# Patient Record
Sex: MALE | Race: WHITE | URBAN - METROPOLITAN AREA
[De-identification: names, ages, dates, MRNs, and addresses within clinical notes are randomized per-mention and may not be internally consistent; named-entity substitution may affect disease eponyms.]

---

## 2017-01-01 ENCOUNTER — OFFICE VISIT (OUTPATIENT)
Dept: FAMILY MEDICINE | Facility: CLINIC | Age: 82
End: 2017-01-01
Payer: COMMERCIAL

## 2017-01-01 ENCOUNTER — HOSPITAL ENCOUNTER (EMERGENCY)
Facility: CLINIC | Age: 82
Discharge: HOME | End: 2017-11-13
Attending: FAMILY MEDICINE | Admitting: FAMILY MEDICINE
Payer: MEDICARE

## 2017-01-01 ENCOUNTER — CARE COORDINATION (OUTPATIENT)
Dept: CARE COORDINATION | Facility: CLINIC | Age: 82
End: 2017-01-01

## 2017-01-01 ENCOUNTER — APPOINTMENT (OUTPATIENT)
Dept: MRI IMAGING | Facility: CLINIC | Age: 82
End: 2017-01-01
Attending: FAMILY MEDICINE
Payer: MEDICARE

## 2017-01-01 ENCOUNTER — DOCUMENTATION ONLY (OUTPATIENT)
Dept: CARE COORDINATION | Facility: CLINIC | Age: 82
End: 2017-01-01

## 2017-01-01 ENCOUNTER — TELEPHONE (OUTPATIENT)
Dept: FAMILY MEDICINE | Facility: CLINIC | Age: 82
End: 2017-01-01

## 2017-01-01 ENCOUNTER — DOCUMENTATION ONLY (OUTPATIENT)
Dept: OTHER | Facility: CLINIC | Age: 82
End: 2017-01-01

## 2017-01-01 ENCOUNTER — TRANSFERRED RECORDS (OUTPATIENT)
Dept: HEALTH INFORMATION MANAGEMENT | Facility: CLINIC | Age: 82
End: 2017-01-01

## 2017-01-01 ENCOUNTER — HOSPITAL ENCOUNTER (OUTPATIENT)
Facility: CLINIC | Age: 82
Setting detail: OBSERVATION
Discharge: HOME | End: 2017-02-24
Attending: FAMILY MEDICINE | Admitting: INTERNAL MEDICINE
Payer: MEDICARE

## 2017-01-01 ENCOUNTER — MEDICAL CORRESPONDENCE (OUTPATIENT)
Dept: HEALTH INFORMATION MANAGEMENT | Facility: CLINIC | Age: 82
End: 2017-01-01

## 2017-01-01 ENCOUNTER — TELEPHONE (OUTPATIENT)
Dept: INTERNAL MEDICINE | Facility: CLINIC | Age: 82
End: 2017-01-01

## 2017-01-01 ENCOUNTER — APPOINTMENT (OUTPATIENT)
Dept: CARDIOLOGY | Facility: CLINIC | Age: 82
End: 2017-01-01
Attending: INTERNAL MEDICINE
Payer: MEDICARE

## 2017-01-01 ENCOUNTER — RADIANT APPOINTMENT (OUTPATIENT)
Dept: GENERAL RADIOLOGY | Facility: OTHER | Age: 82
End: 2017-01-01
Attending: FAMILY MEDICINE
Payer: COMMERCIAL

## 2017-01-01 ENCOUNTER — OFFICE VISIT (OUTPATIENT)
Dept: FAMILY MEDICINE | Facility: OTHER | Age: 82
End: 2017-01-01
Payer: COMMERCIAL

## 2017-01-01 VITALS
BODY MASS INDEX: 24.21 KG/M2 | OXYGEN SATURATION: 100 % | TEMPERATURE: 96.7 F | WEIGHT: 156.9 LBS | RESPIRATION RATE: 16 BRPM | HEART RATE: 64 BPM | DIASTOLIC BLOOD PRESSURE: 46 MMHG | SYSTOLIC BLOOD PRESSURE: 120 MMHG

## 2017-01-01 VITALS
DIASTOLIC BLOOD PRESSURE: 50 MMHG | OXYGEN SATURATION: 100 % | TEMPERATURE: 97 F | RESPIRATION RATE: 16 BRPM | WEIGHT: 153 LBS | SYSTOLIC BLOOD PRESSURE: 122 MMHG | BODY MASS INDEX: 22.59 KG/M2 | HEART RATE: 64 BPM

## 2017-01-01 VITALS
WEIGHT: 151 LBS | DIASTOLIC BLOOD PRESSURE: 46 MMHG | RESPIRATION RATE: 16 BRPM | OXYGEN SATURATION: 100 % | TEMPERATURE: 96.5 F | BODY MASS INDEX: 22.3 KG/M2 | HEART RATE: 68 BPM | SYSTOLIC BLOOD PRESSURE: 128 MMHG

## 2017-01-01 VITALS
OXYGEN SATURATION: 94 % | BODY MASS INDEX: 22.89 KG/M2 | RESPIRATION RATE: 15 BRPM | SYSTOLIC BLOOD PRESSURE: 132 MMHG | TEMPERATURE: 97 F | WEIGHT: 155 LBS | DIASTOLIC BLOOD PRESSURE: 55 MMHG

## 2017-01-01 VITALS
DIASTOLIC BLOOD PRESSURE: 56 MMHG | TEMPERATURE: 97.6 F | OXYGEN SATURATION: 97 % | SYSTOLIC BLOOD PRESSURE: 145 MMHG | BODY MASS INDEX: 23.38 KG/M2 | HEIGHT: 69 IN | HEART RATE: 76 BPM | WEIGHT: 157.85 LBS | RESPIRATION RATE: 18 BRPM

## 2017-01-01 DIAGNOSIS — I50.9 ACUTE ON CHRONIC CONGESTIVE HEART FAILURE, UNSPECIFIED CONGESTIVE HEART FAILURE TYPE: ICD-10-CM

## 2017-01-01 DIAGNOSIS — I25.10 CORONARY ARTERY DISEASE WITHOUT ANGINA PECTORIS, UNSPECIFIED VESSEL OR LESION TYPE, UNSPECIFIED WHETHER NATIVE OR TRANSPLANTED HEART: ICD-10-CM

## 2017-01-01 DIAGNOSIS — I50.22 CHRONIC SYSTOLIC CONGESTIVE HEART FAILURE (H): ICD-10-CM

## 2017-01-01 DIAGNOSIS — G93.89 BRAIN MASS: Primary | ICD-10-CM

## 2017-01-01 DIAGNOSIS — R07.9 CHEST PAIN, UNSPECIFIED TYPE: ICD-10-CM

## 2017-01-01 DIAGNOSIS — D50.9 IRON DEFICIENCY ANEMIA, UNSPECIFIED IRON DEFICIENCY ANEMIA TYPE: ICD-10-CM

## 2017-01-01 DIAGNOSIS — I06.1 RHEUMATIC AORTIC INSUFFICIENCY: ICD-10-CM

## 2017-01-01 DIAGNOSIS — R06.02 SHORTNESS OF BREATH: ICD-10-CM

## 2017-01-01 DIAGNOSIS — I34.0 MODERATE TO SEVERE MITRAL REGURGITATION: ICD-10-CM

## 2017-01-01 DIAGNOSIS — N18.30 CKD (CHRONIC KIDNEY DISEASE) STAGE 3, GFR 30-59 ML/MIN (H): ICD-10-CM

## 2017-01-01 DIAGNOSIS — R47.01 APHASIA DUE TO NEURO-ONCOLOGY DIAGNOSIS: ICD-10-CM

## 2017-01-01 DIAGNOSIS — G93.89 BRAIN MASS: ICD-10-CM

## 2017-01-01 DIAGNOSIS — R05.9 COUGH: Primary | ICD-10-CM

## 2017-01-01 DIAGNOSIS — N18.30 CKD (CHRONIC KIDNEY DISEASE) STAGE 3, GFR 30-59 ML/MIN (H): Primary | ICD-10-CM

## 2017-01-01 DIAGNOSIS — Z71.89 ADVANCED DIRECTIVES, COUNSELING/DISCUSSION: Chronic | ICD-10-CM

## 2017-01-01 DIAGNOSIS — R73.09 ELEVATED GLUCOSE: ICD-10-CM

## 2017-01-01 DIAGNOSIS — Z76.89 HEALTH CARE HOME: Primary | ICD-10-CM

## 2017-01-01 DIAGNOSIS — R05.9 COUGH: ICD-10-CM

## 2017-01-01 DIAGNOSIS — C79.31 MALIGNANT NEOPLASM METASTATIC TO BRAIN (H): Primary | ICD-10-CM

## 2017-01-01 DIAGNOSIS — J81.0 ACUTE PULMONARY EDEMA (H): ICD-10-CM

## 2017-01-01 LAB
ALBUMIN SERPL-MCNC: 3.6 G/DL (ref 3.4–5)
ALP SERPL-CCNC: 50 U/L (ref 40–150)
ALT SERPL W P-5'-P-CCNC: 26 U/L (ref 0–70)
ANION GAP SERPL CALCULATED.3IONS-SCNC: 10 MMOL/L (ref 3–14)
ANION GAP SERPL CALCULATED.3IONS-SCNC: 10 MMOL/L (ref 3–14)
ANION GAP SERPL CALCULATED.3IONS-SCNC: 9 MMOL/L (ref 3–14)
AST SERPL W P-5'-P-CCNC: 30 U/L (ref 0–45)
BASOPHILS # BLD AUTO: 0.1 10E9/L (ref 0–0.2)
BASOPHILS # BLD AUTO: 0.1 10E9/L (ref 0–0.2)
BASOPHILS NFR BLD AUTO: 0.9 %
BASOPHILS NFR BLD AUTO: 1.8 %
BILIRUB SERPL-MCNC: 1.2 MG/DL (ref 0.2–1.3)
BUN SERPL-MCNC: 42 MG/DL (ref 7–30)
BUN SERPL-MCNC: 48 MG/DL (ref 7–30)
BUN SERPL-MCNC: 54 MG/DL (ref 7–30)
CALCIUM SERPL-MCNC: 8.7 MG/DL (ref 8.5–10.1)
CALCIUM SERPL-MCNC: 8.8 MG/DL (ref 8.5–10.1)
CALCIUM SERPL-MCNC: 8.9 MG/DL (ref 8.5–10.1)
CHLORIDE SERPL-SCNC: 104 MMOL/L (ref 94–109)
CHLORIDE SERPL-SCNC: 105 MMOL/L (ref 94–109)
CHLORIDE SERPL-SCNC: 107 MMOL/L (ref 94–109)
CO2 SERPL-SCNC: 26 MMOL/L (ref 20–32)
CO2 SERPL-SCNC: 27 MMOL/L (ref 20–32)
CO2 SERPL-SCNC: 29 MMOL/L (ref 20–32)
CREAT SERPL-MCNC: 1.52 MG/DL (ref 0.66–1.25)
CREAT SERPL-MCNC: 1.56 MG/DL (ref 0.66–1.25)
CREAT SERPL-MCNC: 1.62 MG/DL (ref 0.66–1.25)
DIFFERENTIAL METHOD BLD: ABNORMAL
DIFFERENTIAL METHOD BLD: ABNORMAL
EOSINOPHIL # BLD AUTO: 0.3 10E9/L (ref 0–0.7)
EOSINOPHIL # BLD AUTO: 0.6 10E9/L (ref 0–0.7)
EOSINOPHIL NFR BLD AUTO: 4.2 %
EOSINOPHIL NFR BLD AUTO: 9.3 %
ERYTHROCYTE [DISTWIDTH] IN BLOOD BY AUTOMATED COUNT: 13.3 % (ref 10–15)
ERYTHROCYTE [DISTWIDTH] IN BLOOD BY AUTOMATED COUNT: 13.4 % (ref 10–15)
ERYTHROCYTE [DISTWIDTH] IN BLOOD BY AUTOMATED COUNT: 13.4 % (ref 10–15)
GFR SERPL CREATININE-BSD FRML MDRD: 40 ML/MIN/1.7M2
GFR SERPL CREATININE-BSD FRML MDRD: 42 ML/MIN/1.7M2
GFR SERPL CREATININE-BSD FRML MDRD: 43 ML/MIN/1.7M2
GLUCOSE BLDC GLUCOMTR-MCNC: 101 MG/DL (ref 70–99)
GLUCOSE BLDC GLUCOMTR-MCNC: 104 MG/DL (ref 70–99)
GLUCOSE BLDC GLUCOMTR-MCNC: 107 MG/DL (ref 70–99)
GLUCOSE SERPL-MCNC: 114 MG/DL (ref 70–99)
GLUCOSE SERPL-MCNC: 115 MG/DL (ref 70–99)
GLUCOSE SERPL-MCNC: 118 MG/DL (ref 70–99)
HBA1C MFR BLD: 5.6 % (ref 4.3–6)
HCT VFR BLD AUTO: 30.3 % (ref 40–53)
HCT VFR BLD AUTO: 31.8 % (ref 40–53)
HCT VFR BLD AUTO: 32.1 % (ref 40–53)
HGB BLD-MCNC: 10.2 G/DL (ref 13.3–17.7)
HGB BLD-MCNC: 10.3 G/DL (ref 13.3–17.7)
HGB BLD-MCNC: 9.2 G/DL (ref 13.3–17.7)
IMM GRANULOCYTES # BLD: 0 10E9/L (ref 0–0.4)
IMM GRANULOCYTES NFR BLD: 0.5 %
LYMPHOCYTES # BLD AUTO: 1 10E9/L (ref 0.8–5.3)
LYMPHOCYTES # BLD AUTO: 1.3 10E9/L (ref 0.8–5.3)
LYMPHOCYTES NFR BLD AUTO: 17.1 %
LYMPHOCYTES NFR BLD AUTO: 17.2 %
MCH RBC QN AUTO: 31 PG (ref 26.5–33)
MCH RBC QN AUTO: 31 PG (ref 26.5–33)
MCH RBC QN AUTO: 32.2 PG (ref 26.5–33)
MCHC RBC AUTO-ENTMCNC: 30.4 G/DL (ref 31.5–36.5)
MCHC RBC AUTO-ENTMCNC: 31.8 G/DL (ref 31.5–36.5)
MCHC RBC AUTO-ENTMCNC: 32.4 G/DL (ref 31.5–36.5)
MCV RBC AUTO: 102 FL (ref 78–100)
MCV RBC AUTO: 98 FL (ref 78–100)
MCV RBC AUTO: 99 FL (ref 78–100)
MONOCYTES # BLD AUTO: 0.7 10E9/L (ref 0–1.3)
MONOCYTES # BLD AUTO: 0.9 10E9/L (ref 0–1.3)
MONOCYTES NFR BLD AUTO: 11.9 %
MONOCYTES NFR BLD AUTO: 11.9 %
NEUTROPHILS # BLD AUTO: 3.6 10E9/L (ref 1.6–8.3)
NEUTROPHILS # BLD AUTO: 5.2 10E9/L (ref 1.6–8.3)
NEUTROPHILS NFR BLD AUTO: 59.3 %
NEUTROPHILS NFR BLD AUTO: 65.9 %
NT-PROBNP SERPL-MCNC: 8601 PG/ML (ref 0–450)
PLATELET # BLD AUTO: 149 10E9/L (ref 150–450)
PLATELET # BLD AUTO: 160 10E9/L (ref 150–450)
PLATELET # BLD AUTO: 169 10E9/L (ref 150–450)
POTASSIUM SERPL-SCNC: 4.5 MMOL/L (ref 3.4–5.3)
POTASSIUM SERPL-SCNC: 4.6 MMOL/L (ref 3.4–5.3)
POTASSIUM SERPL-SCNC: 4.7 MMOL/L (ref 3.4–5.3)
PROT SERPL-MCNC: 6.7 G/DL (ref 6.8–8.8)
RBC # BLD AUTO: 2.97 10E12/L (ref 4.4–5.9)
RBC # BLD AUTO: 3.2 10E12/L (ref 4.4–5.9)
RBC # BLD AUTO: 3.29 10E12/L (ref 4.4–5.9)
SODIUM SERPL-SCNC: 141 MMOL/L (ref 133–144)
SODIUM SERPL-SCNC: 143 MMOL/L (ref 133–144)
SODIUM SERPL-SCNC: 143 MMOL/L (ref 133–144)
TROPONIN I SERPL-MCNC: <0.015 UG/L (ref 0–0.04)
TROPONIN I SERPL-MCNC: NORMAL UG/L (ref 0–0.04)
TSH SERPL DL<=0.005 MIU/L-ACNC: 2.22 MU/L (ref 0.4–4)
TSH SERPL DL<=0.05 MIU/L-ACNC: 2.11 MU/L (ref 0.4–4)
VIT B12 SERPL-MCNC: 1134 PG/ML (ref 193–986)
WBC # BLD AUTO: 6.1 10E9/L (ref 4–11)
WBC # BLD AUTO: 6.7 10E9/L (ref 4–11)
WBC # BLD AUTO: 7.8 10E9/L (ref 4–11)

## 2017-01-01 PROCEDURE — 93306 TTE W/DOPPLER COMPLETE: CPT

## 2017-01-01 PROCEDURE — 83036 HEMOGLOBIN GLYCOSYLATED A1C: CPT | Performed by: FAMILY MEDICINE

## 2017-01-01 PROCEDURE — 84443 ASSAY THYROID STIM HORMONE: CPT | Performed by: FAMILY MEDICINE

## 2017-01-01 PROCEDURE — A9585 GADOBUTROL INJECTION: HCPCS | Performed by: RADIOLOGY

## 2017-01-01 PROCEDURE — G0378 HOSPITAL OBSERVATION PER HR: HCPCS

## 2017-01-01 PROCEDURE — 99207 ZZC OFFICE-HOSPITAL ADMIT: CPT | Performed by: FAMILY MEDICINE

## 2017-01-01 PROCEDURE — 84484 ASSAY OF TROPONIN QUANT: CPT | Performed by: FAMILY MEDICINE

## 2017-01-01 PROCEDURE — 99285 EMERGENCY DEPT VISIT HI MDM: CPT | Mod: 25 | Performed by: FAMILY MEDICINE

## 2017-01-01 PROCEDURE — 25000128 H RX IP 250 OP 636: Performed by: RADIOLOGY

## 2017-01-01 PROCEDURE — 82607 VITAMIN B-12: CPT | Performed by: FAMILY MEDICINE

## 2017-01-01 PROCEDURE — 25000132 ZZH RX MED GY IP 250 OP 250 PS 637: Mod: GY | Performed by: INTERNAL MEDICINE

## 2017-01-01 PROCEDURE — 99214 OFFICE O/P EST MOD 30 MIN: CPT | Performed by: FAMILY MEDICINE

## 2017-01-01 PROCEDURE — 99285 EMERGENCY DEPT VISIT HI MDM: CPT | Mod: 25

## 2017-01-01 PROCEDURE — A9270 NON-COVERED ITEM OR SERVICE: HCPCS | Mod: GY | Performed by: FAMILY MEDICINE

## 2017-01-01 PROCEDURE — 80053 COMPREHEN METABOLIC PANEL: CPT | Performed by: FAMILY MEDICINE

## 2017-01-01 PROCEDURE — 70549 MR ANGIOGRAPH NECK W/O&W/DYE: CPT

## 2017-01-01 PROCEDURE — 70553 MRI BRAIN STEM W/O & W/DYE: CPT

## 2017-01-01 PROCEDURE — 99236 HOSP IP/OBS SAME DATE HI 85: CPT | Performed by: INTERNAL MEDICINE

## 2017-01-01 PROCEDURE — 80048 BASIC METABOLIC PNL TOTAL CA: CPT | Performed by: FAMILY MEDICINE

## 2017-01-01 PROCEDURE — 99207 C FOOT EXAM  NO CHARGE: CPT | Performed by: FAMILY MEDICINE

## 2017-01-01 PROCEDURE — 36415 COLL VENOUS BLD VENIPUNCTURE: CPT | Performed by: FAMILY MEDICINE

## 2017-01-01 PROCEDURE — 25000132 ZZH RX MED GY IP 250 OP 250 PS 637: Mod: GY | Performed by: FAMILY MEDICINE

## 2017-01-01 PROCEDURE — 70544 MR ANGIOGRAPHY HEAD W/O DYE: CPT | Mod: XS

## 2017-01-01 PROCEDURE — 93005 ELECTROCARDIOGRAM TRACING: CPT | Performed by: FAMILY MEDICINE

## 2017-01-01 PROCEDURE — 93000 ELECTROCARDIOGRAM COMPLETE: CPT | Performed by: FAMILY MEDICINE

## 2017-01-01 PROCEDURE — 71020 XR CHEST 2 VW: CPT

## 2017-01-01 PROCEDURE — 99214 OFFICE O/P EST MOD 30 MIN: CPT | Mod: 25 | Performed by: FAMILY MEDICINE

## 2017-01-01 PROCEDURE — 85027 COMPLETE CBC AUTOMATED: CPT | Performed by: FAMILY MEDICINE

## 2017-01-01 PROCEDURE — 93010 ELECTROCARDIOGRAM REPORT: CPT | Mod: Z6 | Performed by: FAMILY MEDICINE

## 2017-01-01 PROCEDURE — A9270 NON-COVERED ITEM OR SERVICE: HCPCS | Mod: GY | Performed by: INTERNAL MEDICINE

## 2017-01-01 PROCEDURE — 00000146 ZZHCL STATISTIC GLUCOSE BY METER IP

## 2017-01-01 PROCEDURE — 85025 COMPLETE CBC W/AUTO DIFF WBC: CPT | Performed by: FAMILY MEDICINE

## 2017-01-01 PROCEDURE — 93306 TTE W/DOPPLER COMPLETE: CPT | Mod: 26 | Performed by: INTERNAL MEDICINE

## 2017-01-01 PROCEDURE — 83880 ASSAY OF NATRIURETIC PEPTIDE: CPT | Performed by: FAMILY MEDICINE

## 2017-01-01 PROCEDURE — 93005 ELECTROCARDIOGRAM TRACING: CPT

## 2017-01-01 PROCEDURE — 96374 THER/PROPH/DIAG INJ IV PUSH: CPT

## 2017-01-01 PROCEDURE — 25000125 ZZHC RX 250: Performed by: RADIOLOGY

## 2017-01-01 PROCEDURE — 93010 ELECTROCARDIOGRAM REPORT: CPT | Performed by: FAMILY MEDICINE

## 2017-01-01 PROCEDURE — 25000128 H RX IP 250 OP 636: Performed by: FAMILY MEDICINE

## 2017-01-01 RX ORDER — FUROSEMIDE 20 MG
20 TABLET ORAL DAILY
Status: DISCONTINUED | OUTPATIENT
Start: 2017-01-01 | End: 2017-01-01 | Stop reason: HOSPADM

## 2017-01-01 RX ORDER — GADOBUTROL 604.72 MG/ML
7.5 INJECTION INTRAVENOUS ONCE
Status: COMPLETED | OUTPATIENT
Start: 2017-01-01 | End: 2017-01-01

## 2017-01-01 RX ORDER — BISACODYL 10 MG
10 SUPPOSITORY, RECTAL RECTAL DAILY
COMMUNITY

## 2017-01-01 RX ORDER — VALSARTAN 40 MG/1
20 TABLET ORAL DAILY
Status: DISCONTINUED | OUTPATIENT
Start: 2017-01-01 | End: 2017-01-01 | Stop reason: HOSPADM

## 2017-01-01 RX ORDER — AMLODIPINE BESYLATE 10 MG/1
5 TABLET ORAL DAILY
Qty: 90 TABLET | Refills: 3 | COMMUNITY
Start: 2017-01-01 | End: 2017-01-01

## 2017-01-01 RX ORDER — NALOXONE HYDROCHLORIDE 0.4 MG/ML
.1-.4 INJECTION, SOLUTION INTRAMUSCULAR; INTRAVENOUS; SUBCUTANEOUS
Status: DISCONTINUED | OUTPATIENT
Start: 2017-01-01 | End: 2017-01-01 | Stop reason: HOSPADM

## 2017-01-01 RX ORDER — DEXTROSE MONOHYDRATE 25 G/50ML
25-50 INJECTION, SOLUTION INTRAVENOUS
Status: DISCONTINUED | OUTPATIENT
Start: 2017-01-01 | End: 2017-01-01 | Stop reason: HOSPADM

## 2017-01-01 RX ORDER — HALOPERIDOL 2 MG/ML
.5-1 SOLUTION ORAL EVERY 6 HOURS
COMMUNITY

## 2017-01-01 RX ORDER — FUROSEMIDE 10 MG/ML
40 INJECTION INTRAMUSCULAR; INTRAVENOUS ONCE
Status: COMPLETED | OUTPATIENT
Start: 2017-01-01 | End: 2017-01-01

## 2017-01-01 RX ORDER — METOPROLOL SUCCINATE 50 MG/1
50 TABLET, EXTENDED RELEASE ORAL DAILY
Status: DISCONTINUED | OUTPATIENT
Start: 2017-01-01 | End: 2017-01-01 | Stop reason: HOSPADM

## 2017-01-01 RX ORDER — NICOTINE POLACRILEX 4 MG
15-30 LOZENGE BUCCAL
Status: DISCONTINUED | OUTPATIENT
Start: 2017-01-01 | End: 2017-01-01 | Stop reason: HOSPADM

## 2017-01-01 RX ORDER — MORPHINE SULFATE 100 MG/5ML
2.5-5 SOLUTION ORAL
COMMUNITY

## 2017-01-01 RX ORDER — ASPIRIN 81 MG/1
81 TABLET, CHEWABLE ORAL DAILY
Status: DISCONTINUED | OUTPATIENT
Start: 2017-01-01 | End: 2017-01-01 | Stop reason: HOSPADM

## 2017-01-01 RX ORDER — ATROPINE SULFATE 10 MG/G
2-4 OINTMENT OPHTHALMIC
COMMUNITY

## 2017-01-01 RX ORDER — FUROSEMIDE 20 MG
20 TABLET ORAL DAILY
Qty: 30 TABLET | Refills: 0 | Status: SHIPPED | OUTPATIENT
Start: 2017-01-01 | End: 2017-01-01

## 2017-01-01 RX ORDER — METOPROLOL SUCCINATE 50 MG/1
TABLET, EXTENDED RELEASE ORAL
Qty: 135 TABLET | Refills: 1 | COMMUNITY
Start: 2017-01-01

## 2017-01-01 RX ORDER — LIDOCAINE 40 MG/G
CREAM TOPICAL
Status: DISCONTINUED | OUTPATIENT
Start: 2017-01-01 | End: 2017-01-01 | Stop reason: HOSPADM

## 2017-01-01 RX ORDER — ATORVASTATIN CALCIUM 40 MG/1
40 TABLET, FILM COATED ORAL DAILY
Status: DISCONTINUED | OUTPATIENT
Start: 2017-01-01 | End: 2017-01-01

## 2017-01-01 RX ORDER — ANTACID TABLETS 500 MG/1
500 TABLET, CHEWABLE ORAL 2 TIMES DAILY
COMMUNITY

## 2017-01-01 RX ORDER — DEXAMETHASONE 4 MG/1
4 TABLET ORAL 2 TIMES DAILY
COMMUNITY

## 2017-01-01 RX ORDER — ACETAMINOPHEN 650 MG/1
650 SUPPOSITORY RECTAL EVERY 4 HOURS PRN
COMMUNITY

## 2017-01-01 RX ORDER — FUROSEMIDE 20 MG
20 TABLET ORAL DAILY
Qty: 30 TABLET | Refills: 5 | Status: SHIPPED | OUTPATIENT
Start: 2017-01-01 | End: 2017-01-01

## 2017-01-01 RX ORDER — CETIRIZINE HYDROCHLORIDE 10 MG/1
10 TABLET ORAL DAILY
COMMUNITY

## 2017-01-01 RX ADMIN — METOPROLOL SUCCINATE 50 MG: 50 TABLET, EXTENDED RELEASE ORAL at 08:49

## 2017-01-01 RX ADMIN — FUROSEMIDE 20 MG: 20 TABLET ORAL at 08:49

## 2017-01-01 RX ADMIN — FUROSEMIDE 40 MG: 10 INJECTION, SOLUTION INTRAVENOUS at 19:04

## 2017-01-01 RX ADMIN — VALSARTAN 20 MG: 40 TABLET ORAL at 10:58

## 2017-01-01 RX ADMIN — ASPIRIN 81 MG 81 MG: 81 TABLET ORAL at 08:49

## 2017-01-01 RX ADMIN — ASPIRIN 81 MG 81 MG: 81 TABLET ORAL at 21:52

## 2017-01-01 RX ADMIN — GADOBUTROL 7.5 ML: 604.72 INJECTION INTRAVENOUS at 11:58

## 2017-01-01 RX ADMIN — SODIUM CHLORIDE 50 ML: 9 INJECTION, SOLUTION INTRAVENOUS at 11:59

## 2017-01-01 ASSESSMENT — ENCOUNTER SYMPTOMS
WEAKNESS: 0
FREQUENCY: 0
COUGH: 0
BLOOD IN STOOL: 0
WHEEZING: 0
DIFFICULTY URINATING: 0
FEVER: 0
DYSURIA: 0
PALPITATIONS: 0
CHILLS: 0
DIARRHEA: 0
SHORTNESS OF BREATH: 0
ARTHRALGIAS: 0
WEAKNESS: 0
SORE THROAT: 0
HEADACHES: 0
SINUS PRESSURE: 0
NAUSEA: 1
DIARRHEA: 0
WHEEZING: 0
STRIDOR: 0
NAUSEA: 0
FREQUENCY: 0
FEVER: 0
PALPITATIONS: 0
CHILLS: 0
DIAPHORESIS: 0
CHEST TIGHTNESS: 1
CONSTIPATION: 0
DIZZINESS: 0
VOMITING: 0
SPEECH DIFFICULTY: 1
MYALGIAS: 0
LIGHT-HEADEDNESS: 0
ABDOMINAL PAIN: 0
SHORTNESS OF BREATH: 1
VOMITING: 0

## 2017-01-01 ASSESSMENT — PAIN SCALES - GENERAL
PAINLEVEL: MODERATE PAIN (4)
PAINLEVEL: MILD PAIN (2)
PAINLEVEL: SEVERE PAIN (6)

## 2017-01-17 NOTE — TELEPHONE ENCOUNTER
": 1926  PHONE #'s: [phone] (home)     PRESENTING PROBLEM:  Had one short dizzy spell on  while reading in a chair and again on Monday. He is wondering if he needs to be seen?    NURSING ASSESSMENT  Description:   HE denies any chest pain or SOB associated with this.  \" On  the room spun a little bit and I kind of felt nauseated . That didn't happen on Monday. I have NOT had any dizzy spells yet today.  [facility]. I have been keeping a journal of my BP and pulse rate. IT was 110/58 and pulse is always between 60 to 70.\"   Onset/duration:   1/15/17  Precip. factors:   Etiology unknown. \" I did drink 3 cups of coffee in the morning and not much other liquids.\"  Assoc. Sx:   NONE. NO one sided weakness, no troubles with speech or vision, NO headache, NO LOC.   Improves/worsens Sx:   Improved.   Pain scale (1-10)   0/10  Sx specific meds:  Meclizine on Monday and NO dizzy spells since.  Last exam/Tx:  Has NOT been seen for this.     RECOMMENDED DISPOSITION:  Home care advice - *  During a dizzy spell, lie flat or sit with head down on your lap for a few minutes and take slow deep breaths.   *  If dizziness is accompanied by anxiety, rapid breathing, and numbness in the face or fingers, breathe into a paper bag for 5 to 10 minutes, making sure the mouth and nose are covered by the bag.   *  Sit up or stand up slowly, Avoid sudden changes in posture.  *  If vomits, wait one hour before eating or drinking . Take small frequent sips every 10 minutes (1 TBS ) until knows can drink without vomiting. Then Ok to drink freely. Keep self hydrated.     * Seek emergency care if sudden onset of chest pain, decreased level of consciousness, weakness or difficulty speaking, slow ( < 50) or rapid ( > 130) heart rate.    Will comply with recommendation: YES  If further questions/concerns or if Sx do not improve, worsen or new Sx develop, call your PCP or " [facility] as soon as possible.    NOTES:  Disposition was determined by the first positive assessment question, therefore all previous assessment questions were negative.  Informed to check provider manual or call insurance company to assure coverage.    Guideline used: Dizziness.     Telephone Triage Protocols for Nurses, Fifth Edition, Dinah Jackson RN  January 17, 2017

## 2017-01-17 NOTE — TELEPHONE ENCOUNTER
Reason for Call:  THIS WEEK Day Appointment, Requested Provider:  Mikie Ibanez M.D.    PCP: Mikie Ibanez    Reason for visit: Pt states he's had 2 dizzy spells in the last 2 days, does not want to wait to be seen until first avail in mid Feb, please advise    Duration of symptoms: 2 days    Have you been treated for this in the past? No    Additional comments:     Can we leave a detailed message on this number? YES    Phone number patient can be reached at: Home number on file [phone] (home)    Best Time: any    Call taken on 1/17/2017 at 8:01 AM by Rosalinda Moise

## 2017-01-24 NOTE — TELEPHONE ENCOUNTER
Wife is calling to report patient has been having dizzy spells for the past multiple days.  She states they know there is not much they can do for a 89 year old man, but they are looking for suggestions for what to do at home to improve this.  They are reporting [facility] and [facility].  Wife is informed they should be increasing fluids to a minimum of 6 glasses of 8 oz of water daily.  They are instructed to keep a log of daily fluid intake.  They are also instructed to keep a log of when he has these dizzy spells to see if there is a pattern with what he has eaten before hand.    They are currently using Dramamine for dizzy spells and they are instructed to go get Meclizine for these symptoms.     Patient understands and agrees to this plan.  They will call back with any findings or of they would like to be seen for this.  Closing this encounter.  Nettie Whitman RN

## 2017-02-23 PROBLEM — I50.9 CHF EXACERBATION (H): Status: ACTIVE | Noted: 2017-01-01

## 2017-02-23 NOTE — IP AVS SNAPSHOT
MRN:[id number]                      After Visit Summary   2/23/2017    Dung López    MRN: [id number]           Thank you!     Thank you for choosing [facility] for your care. Our goal is always to provide you with excellent care. Hearing back from our patients is one way we can continue to improve our services. Please take a few minutes to complete the written survey that you may receive in the mail after you visit with us. Thank you!        Patient Information     Date Of Birth          4/1/1926        About your hospital stay     You were admitted on:  February 23, 2017 You last received care in the:  [address] [facility]    You were discharged on:  February 24, 2017        Reason for your hospital stay       Hospitalized due to concern for worsening fluid build up in lungs from heart failure and improved                  Who to Call     For medical emergencies, please call 911.  For non-urgent questions about your medical care, please call your primary care provider or clinic, [phone]          Attending Provider     Provider Specialty    Dev Thorpe MD [facility], Andres Deluna MD Internal Medicine    Sin Lozano MD Internal Medicine       Primary Care Provider Office Phone # Fax #    Mikie Dung Ibanez [phone]       [facility] [address] [place] [address]         When to contact your care team       Call your primary doctor if you have any of the following:  increased shortness of breath, increased swelling or weight gain more than 2 pounds in 1 day or 5 pounds in 1 week.                  After Care Instructions     Activity       Your activity upon discharge: activity as tolerated            Diet       Follow this diet upon discharge: Orders Placed This Encounter      Combination Diet 3130-2982 Calories: Moderate Consistent CHO (4-6 CHO units/meal); 2 gm NA Diet            Monitor and  "record       blood glucose according to your usual home routine  weight every day                  Follow-up Appointments     Follow-up and recommended labs and tests        Follow up with primary care provider, Mikie Ibanez, within 7 days to evaluate medication change and for hospital follow- up.  The following labs/tests are recommended: basic metabolic panel.                  Pending Results     No orders found for last 3 day(s).            Statement of Approval     Ordered          17 1304  I have reviewed and agree with all the recommendations and orders detailed in this document.  EFFECTIVE NOW     Approved and electronically signed by:  Sin Lozano MD             Admission Information     Date & Time Provider Department Dept. Phone    2017 Sin Lozano MD [address] [phone]      Your Vitals Were     Blood Pressure Pulse Temperature Respirations Height Weight    145/56 76 97.6  F (36.4  C) (Oral) 18 1.753 m (5' 9\") 71.6 kg (157 lb 13.6 oz)    Pulse Oximetry BMI (Body Mass Index)                97% 23.31 kg/m2          MyChart Information     [employer] lets you send messages to your doctor, view your test results, renew your prescriptions, schedule appointments and more. To sign up, go to [email or web address] . Click on \"Log in\" on the left side of the screen, which will take you to the Welcome page. Then click on \"Sign up Now\" on the right side of the page.     You will be asked to enter the access code listed below, as well as some personal information. Please follow the directions to create your username and password.     Your access code is: [email or web address]  Expires: 2017  3:22 PM     Your access code will  in 90 days. If you need help or a new code, please call your [facility] clinic or [phone].        Care EveryWhere ID     This is your Care EveryWhere ID. This could be used by other organizations to access your [facility] medical " records  [id number]           Review of your medicines      CONTINUE these medicines which may have CHANGED, or have new prescriptions. If we are uncertain of the size of tablets/capsules you have at home, strength may be listed as something that might have changed.        Dose / Directions    furosemide 20 MG tablet   Commonly known as:  LASIX   This may have changed:  See the new instructions.   Used for:  Chronic systolic congestive heart failure (H)        Dose:  20 mg   Take 1 tablet (20 mg) by mouth daily   Quantity:  30 tablet   Refills:  0       metoprolol 50 MG 24 hr tablet   Commonly known as:  TOPROL-XL   Indication:  per pt he takes once a day   This may have changed:    - how much to take  - how to take this  - when to take this        Take 1/2 tab every am and 1/2 tab every pm   Quantity:  135 tablet   Refills:  1         CONTINUE these medicines which have NOT CHANGED        Dose / Directions    ACCU-CHEK LURDES PLUS test strip   Used for:  Elevated glucose   Generic drug:  blood glucose monitoring        TEST UP TO 4 TIMES DAILY   Quantity:  100 each   Refills:  9       amLODIPine 10 MG tablet   Commonly known as:  NORVASC        Dose:  5 mg   Take 0.5 tablets (5 mg) by mouth daily   Quantity:  90 tablet   Refills:  3       artificial saliva Soln solution   Used for:  Dry mouth        Dose:  5 mL   Swish and spit 5 mLs in mouth as needed for dry mouth   Quantity:  1 Bottle   Refills:  5       aspirin 81 MG tablet        Dose:  81 mg   Take 1 tablet (81 mg) by mouth daily   Quantity:  30 tablet   Refills:  0       blood glucose monitoring lancets   Used for:  Elevated glucose        2-4 daily as needed for changing blood sugars   Quantity:  200 each   Refills:  3       CITRACAL + D PO        Dose:  2 tablet   Take 2 tablets by mouth daily.   Refills:  0       cyanocobalamin 1000 MCG tablet   Commonly known as:  vitamin  B-12   Used for:  Anemia, unspecified        2 daily   Quantity:  100 Tab    Refills:  11       EAR DROPS OT        Place  in ear(s). As needed   Refills:  0       ferrous sulfate 325 (65 FE) MG tablet   Commonly known as:  IRON        Dose:  1 tablet   Take 1 tablet by mouth daily (with breakfast).   Refills:  0       LIPITOR 80 MG tablet   Generic drug:  atorvastatin        Dose:  40 mg   Take 0.5 tablets (40 mg) by mouth daily   Quantity:  30 tablet   Refills:  0       valsartan 40 MG tablet   Commonly known as:  DIOVAN   Used for:  Coronary atherosclerosis of unspecified type of vessel, native or graft, Congenital anomaly of aortic arch, Rheumatic mitral insufficiency        1/2 TABLET IN AM   Refills:  1       VITAMIN D PO        Dose:  1 tablet   Take 1 tablet by mouth daily.   Refills:  0            Where to get your medicines      These medications were sent to [facility] [address] [place] [address] - [address]  [address], [facility] [address]     Phone:  [phone]     furosemide 20 MG tablet                Protect others around you: Learn how to safely use, store and throw away your medicines at www.disposemymeds.org.             Medication List: This is a list of all your medications and when to take them. Check marks below indicate your daily home schedule. Keep this list as a reference.      Medications           Morning Afternoon Evening Bedtime As Needed    ACCU-CHEK LURDES PLUS test strip   TEST UP TO 4 TIMES DAILY   Generic drug:  blood glucose monitoring                                amLODIPine 10 MG tablet   Commonly known as:  NORVASC   Take 0.5 tablets (5 mg) by mouth daily                                   artificial saliva Soln solution   Swish and spit 5 mLs in mouth as needed for dry mouth                                aspirin 81 MG tablet   Take 1 tablet (81 mg) by mouth daily                                   blood glucose monitoring lancets   2-4 daily as needed for changing blood sugars                                CITRACAL + D PO   Take 2 tablets by mouth  daily.                                   cyanocobalamin 1000 MCG tablet   Commonly known as:  vitamin  B-12   2 daily                                   EAR DROPS OT   Place  in ear(s). As needed                                   ferrous sulfate 325 (65 FE) MG tablet   Commonly known as:  IRON   Take 1 tablet by mouth daily (with breakfast).                                   furosemide 20 MG tablet   Commonly known as:  LASIX   Take 1 tablet (20 mg) by mouth daily   Last time this was given:  20 mg on 2/24/2017  8:49 AM                                   LIPITOR 80 MG tablet   Take 0.5 tablets (40 mg) by mouth daily   Generic drug:  atorvastatin                                   metoprolol 50 MG 24 hr tablet   Commonly known as:  TOPROL-XL   Take 1/2 tab every am and 1/2 tab every pm   Last time this was given:  50 mg on 2/24/2017  8:49 AM                                      valsartan 40 MG tablet   Commonly known as:  DIOVAN   1/2 TABLET IN AM   Last time this was given:  20 mg on 2/24/2017 10:58 AM                                   VITAMIN D PO   Take 1 tablet by mouth daily.                                             More Information        Patient Education    Metoprolol Succinate Oral tablet, extended-release    Metoprolol Tartrate Oral tablet    Metoprolol Tartrate Solution for injection  Metoprolol Tartrate Oral tablet  What is this medicine?  METOPROLOL (me TOE proe lole) is a beta-blocker. Beta-blockers reduce the workload on the heart and help it to beat more regularly. This medicine is used to treat high blood pressure and to prevent chest pain. It is also used to after a heart attack and to prevent an additional heart attack from occurring.  This medicine may be used for other purposes; ask your health care provider or pharmacist if you have questions.  What should I tell my health care provider before I take this medicine?  They need to know if you have any of these conditions:    diabetes    heart or  vessel disease like slow heart rate, worsening heart failure, heart block, sick sinus syndrome or Raynaud's disease    kidney disease    liver disease    lung or breathing disease, like asthma or emphysema    pheochromocytoma    thyroid disease    an unusual or allergic reaction to metoprolol, other beta-blockers, medicines, foods, dyes, or preservatives    pregnant or trying to get pregnant    breast-feeding  How should I use this medicine?  Take this medicine by mouth with a drink of water. Follow the directions on the prescription label. Take this medicine immediately after meals. Take your doses at regular intervals. Do not take more medicine than directed. Do not stop taking this medicine suddenly. This could lead to serious heart-related effects.  Talk to your pediatrician regarding the use of this medicine in children. Special care may be needed.  Overdosage: If you think you have taken too much of this medicine contact a poison control center or emergency room at once.  NOTE: This medicine is only for you. Do not share this medicine with others.  What if I miss a dose?  If you miss a dose, take it as soon as you can. If it is almost time for your next dose, take only that dose. Do not take double or extra doses.  What may interact with this medicine?  This medicine may interact with the following medications:    certain medicines for blood pressure, heart disease, irregular heart beat    certain medicines for depression like monoamine oxidase (MAO) inhibitors, fluoxetine, or paroxetine    clonidine    dobutamine    epinephrine    isoproterenol    reserpine  This list may not describe all possible interactions. Give your health care provider a list of all the medicines, herbs, non-prescription drugs, or dietary supplements you use. Also tell them if you smoke, drink alcohol, or use illegal drugs. Some items may interact with your medicine.  What should I watch for while using this medicine?  Visit your doctor  or health care professional for regular check ups. Contact your doctor right away if your symptoms worsen. Check your blood pressure and pulse rate regularly. Ask your health care professional what your blood pressure and pulse rate should be, and when you should contact them.  You may get drowsy or dizzy. Do not drive, use machinery, or do anything that needs mental alertness until you know how this medicine affects you. Do not sit or stand up quickly, especially if you are an older patient. This reduces the risk of dizzy or fainting spells. Contact your doctor if these symptoms continue. Alcohol may interfere with the effect of this medicine. Avoid alcoholic drinks.  What side effects may I notice from receiving this medicine?  Side effects that you should report to your doctor or health care professional as soon as possible:    allergic reactions like skin rash, itching or hives    cold or numb hands or feet    depression    difficulty breathing    faint    fever with sore throat    irregular heartbeat, chest pain    rapid weight gain    swollen legs or ankles  Side effects that usually do not require medical attention (report to your doctor or health care professional if they continue or are bothersome):    anxiety or nervousness    change in sex drive or performance    dry skin    headache    nightmares or trouble sleeping    short term memory loss    stomach upset or diarrhea    unusually tired  This list may not describe all possible side effects. Call your doctor for medical advice about side effects. You may report side effects to FDA at 6-317-FDA-2288.  Where should I keep my medicine?  Keep out of the reach of children.  Store at room temperature between 15 and 30 degrees C (59 and 86 degrees F). Throw away any unused medicine after the expiration date.  NOTE:This sheet is a summary. It may not cover all possible information. If you have questions about this medicine, talk to your doctor, pharmacist, or  health care provider. Copyright  2016 Gold Standard                Patient Education    Furosemide Oral solution    Furosemide Oral tablet    Furosemide Solution for injection  Furosemide Oral tablet  What is this medicine?  FUROSEMIDE (fyjanna [place] se mide) is a diuretic. It helps you make more urine and to lose salt and excess water from your body. This medicine is used to treat high blood pressure, and edema or swelling from heart, kidney, or liver disease.  This medicine may be used for other purposes; ask your health care provider or pharmacist if you have questions.  What should I tell my health care provider before I take this medicine?  They need to know if you have any of these conditions:    abnormal blood electrolytes    diarrhea or vomiting    gout    heart disease    kidney disease, small amounts of urine, or difficulty passing urine    liver disease    an unusual or allergic reaction to furosemide, sulfa drugs, other medicines, foods, dyes, or preservatives    pregnant or trying to get pregnant    breast-feeding  How should I use this medicine?  Take this medicine by mouth with a glass of water. Follow the directions on the prescription label. You may take this medicine with or without food. If it upsets your stomach, take it with food or milk. Do not take your medicine more often than directed. Remember that you will need to pass more urine after taking this medicine. Do not take your medicine at a time of day that will cause you problems. Do not take at bedtime.  Talk to your pediatrician regarding the use of this medicine in children. While this drug may be prescribed for selected conditions, precautions do apply.  Overdosage: If you think you have taken too much of this medicine contact a poison control center or emergency room at once.  NOTE: This medicine is only for you. Do not share this medicine with others.  What if I miss a dose?  If you miss a dose, take it as soon as you can. If it is almost  time for your next dose, take only that dose. Do not take double or extra doses.  What may interact with this medicine?    aspirin and aspirin-like medicines    certain antibiotics    chloral hydrate    cisplatin    cyclosporine    digoxin    diuretics    laxatives    lithium    medicines for blood pressure    medicines that relax muscles for surgery    methotrexate    NSAIDs, medicines for pain and inflammation like ibuprofen, naproxen, or indomethacin    phenytoin    steroid medicines like prednisone or cortisone    sucralfate  This list may not describe all possible interactions. Give your health care provider a list of all the medicines, herbs, non-prescription drugs, or dietary supplements you use. Also tell them if you smoke, drink alcohol, or use illegal drugs. Some items may interact with your medicine.  What should I watch for while using this medicine?  Visit your doctor or health care professional for regular checks on your progress. Check your blood pressure regularly. Ask your doctor or health care professional what your blood pressure should be, and when you should contact him or her. If you are a diabetic, check your blood sugar as directed.  You may need to be on a special diet while taking this medicine. Check with your doctor. Also, ask how many glasses of fluid you need to drink a day. You must not get dehydrated.  You may get drowsy or dizzy. Do not drive, use machinery, or do anything that needs mental alertness until you know how this drug affects you. Do not stand or sit up quickly, especially if you are an older patient. This reduces the risk of dizzy or fainting spells. Alcohol can make you more drowsy and dizzy. Avoid alcoholic drinks.  This medicine can make you more sensitive to the sun. Keep out of the sun. If you cannot avoid being in the sun, wear protective clothing and use sunscreen. Do not use sun lamps or tanning beds/booths.  What side effects may I notice from receiving this  medicine?  Side effects that you should report to your doctor or health care professional as soon as possible:    blood in urine or stools    dry mouth    fever or chills    hearing loss or ringing in the ears    irregular heartbeat    muscle pain or weakness, cramps    skin rash    stomach upset, pain, or nausea    tingling or numbness in the hands or feet    unusually weak or tired    vomiting or diarrhea    yellowing of the eyes or skin  Side effects that usually do not require medical attention (report to your doctor or health care professional if they continue or are bothersome):    headache    loss of appetite    unusual bleeding or bruising  This list may not describe all possible side effects. Call your doctor for medical advice about side effects. You may report side effects to FDA at 9-875-FDA-2055.  Where should I keep my medicine?  Keep out of the reach of children.  Store at room temperature between 15 and 30 degrees C (59 and 86 degrees F). Protect from light. Throw away any unused medicine after the expiration date.  NOTE:This sheet is a summary. It may not cover all possible information. If you have questions about this medicine, talk to your doctor, pharmacist, or health care provider. Copyright  2016 Gold Standard

## 2017-02-23 NOTE — NURSING NOTE
"Chief Complaint   Patient presents with     Cough     x's 10 days     Shortness of Breath     with excertion       Initial /46 (BP Location: Left arm, Patient Position: Chair, Cuff Size: Adult Regular)  Pulse 64  Temp 96.7  F (35.9  C) (Temporal)  Wt 156 lb 14.4 oz (71.2 kg)  BMI 24.21 kg/m2 Estimated body mass index is 24.21 kg/(m^2) as calculated from the following:    Height as of 4/6/16: 5' 7.5\" (1.715 m).    Weight as of this encounter: 156 lb 14.4 oz (71.2 kg).  Medication Reconciliation: complete  "

## 2017-02-23 NOTE — MR AVS SNAPSHOT
"              After Visit Summary   2017    Dung López    MRN: [id number]           Patient Information     Date Of Birth          1926        Visit Information        Provider Department      2017 1:30 PM Dev Waterman MD [facility]        Today's Diagnoses     Cough    -  1    Shortness of breath        Iron deficiency anemia, unspecified iron deficiency anemia type        Chest pain, unspecified type           Follow-ups after your visit        Who to contact     If you have questions or need follow up information about today's clinic visit or your schedule please contact [facility] directly at [phone].  Normal or non-critical lab and imaging results will be communicated to you by ContaAzul[employer], letter or phone within 4 business days after the clinic has received the results. If you do not hear from us within 7 days, please contact the clinic through [employer]ul[employer] or phone. If you have a critical or abnormal lab result, we will notify you by phone as soon as possible.  Submit refill requests through My [employer] Database or call your pharmacy and they will forward the refill request to us. Please allow 3 business days for your refill to be completed.          Additional Information About Your Visit        MyChart Information     My [employer] lets you send messages to your doctor, view your test results, renew your prescriptions, schedule appointments and more. To sign up, go to [email or web address] . Click on \"Log in\" on the left side of the screen, which will take you to the Welcome page. Then click on \"Sign up Now\" on the right side of the page.     You will be asked to enter the access code listed below, as well as some personal information. Please follow the directions to create your username and password.     Your access code is: [email or web address]  Expires: 2017  3:22 PM     Your access code will  in 90 days. If you need help or a new code, please call your [facility] or " [phone].        Care EveryWhere ID     This is your Care EveryWhere ID. This could be used by other organizations to access your [facility] medical records  [id number]        Your Vitals Were     Pulse Temperature Respirations Pulse Oximetry BMI (Body Mass Index)       64 96.7  F (35.9  C) (Temporal) 16 100% 24.21 kg/m2        Blood Pressure from Last 3 Encounters:   02/23/17 120/46   07/18/16 128/64   04/06/16 128/48    Weight from Last 3 Encounters:   02/23/17 156 lb 14.4 oz (71.2 kg)   07/18/16 151 lb (68.5 kg)   04/06/16 152 lb (68.9 kg)              We Performed the Following     CBC with platelets differential     EKG 12-lead complete w/read - Clinics     N terminal pro BNP outpatient     Troponin I          Today's Medication Changes          These changes are accurate as of: 2/23/17  3:22 PM.  If you have any questions, ask your nurse or doctor.               These medicines have changed or have updated prescriptions.        Dose/Directions    amLODIPine 10 MG tablet   Commonly known as:  NORVASC   This may have changed:  Another medication with the same name was removed. Continue taking this medication, and follow the directions you see here.   Changed by:  Dev Waterman MD        Dose:  5 mg   Take 0.5 tablets (5 mg) by mouth daily   Quantity:  90 tablet   Refills:  3                Primary Care Provider Office Phone # Fax #    Mikie Dung Ibanez [phone]       [place] [address] [place] [address]        Thank you!     Thank you for choosing [facility]  for your care. Our goal is always to provide you with excellent care. Hearing back from our patients is one way we can continue to improve our services. Please take a few minutes to complete the written survey that you may receive in the mail after your visit with us. Thank you!             Your Updated Medication List - Protect others around you: Learn how to safely use, store and  throw away your medicines at www.disposemymeds.org.          This list is accurate as of: 2/23/17  3:22 PM.  Always use your most recent med list.                   Brand Name Dispense Instructions for use    ACCU-CHEK LURDES PLUS test strip   Generic drug:  blood glucose monitoring     100 each    TEST UP TO 4 TIMES DAILY       amLODIPine 10 MG tablet    NORVASC    90 tablet    Take 0.5 tablets (5 mg) by mouth daily       artificial saliva Soln solution     1 Bottle    Swish and spit 5 mLs in mouth as needed for dry mouth       aspirin 81 MG tablet     30 tablet    Take 1 tablet (81 mg) by mouth daily       blood glucose monitoring lancets     200 each    2-4 daily as needed for changing blood sugars       CITRACAL + D PO      Take 2 tablets by mouth daily.       cyanocobalamin 1000 MCG tablet    vitamin  B-12    100 Tab    2 daily       EAR DROPS OT      Place  in ear(s). As needed       ferrous sulfate 325 (65 FE) MG tablet    IRON     Take 1 tablet by mouth daily (with breakfast).       furosemide 20 MG tablet    LASIX    30 tablet    TAKE ONE TABLET BY MOUTH ONCE DAILY AS NEEDED       LIPITOR 80 MG tablet   Generic drug:  atorvastatin     30 tablet    Take 0.5 tablets (40 mg) by mouth daily       metoprolol 50 MG 24 hr tablet    TOPROL-XL    135 tablet    Take 1/2 tab every am and 1/2 tab every pm       valsartan 40 MG tablet    DIOVAN     1/2 TABLET IN AM       VITAMIN D PO      Take 1 tablet by mouth daily.

## 2017-02-23 NOTE — PROGRESS NOTES
SUBJECTIVE:                                                    Dung López is a 89 year old male who presents to clinic today for the following health issues:      RESPIRATORY SYMPTOMS      Duration: 2 weeks    Description  nasal congestion, cough, hoarse voice, myalgias and dizzy    Severity: moderate    Accompanying signs and symptoms: None    History (predisposing factors):  none    Precipitating or alleviating factors: None    Therapies tried and outcome:  rest and fluids guaifenesin       Cough:   sx as noted above. In addition, has developed coy and c/p with exertion.   no fever chills or significant purulent sputum. No hemoptysis or much in the way of leg edema.   he has some chronic heart valvular issues.    ROS: o/w neg    Exam:/46 (BP Location: Left arm, Patient Position: Chair, Cuff Size: Adult Regular)  Pulse 64  Temp 96.7  F (35.9  C) (Temporal)  Resp 16  Wt 156 lb 14.4 oz (71.2 kg)  SpO2 100%  BMI 24.21 kg/m2  Constitutional: healthy, alert and no distress    ENT: ENT exam normal, no neck nodes or sinus tenderness  Cardiovascular: RR gr 2-3 larry  Respiratory: bilat rales  Gastrointestinal: Abdomen soft, non-tender. BS normal. No masses, organomegaly  Extr: tr edema    CXR: bliat pl effusions, poss pneumonia  EKG: non acute, first degree AV block  CBC hg 10.3  Wbc nl  Trop, bnp pending  [facility]    IMP: pneumonia/chf/ angina  PLAN: discussed with Po saha and Jovanni Thorpe, will trans to ED for likely hospital admission    dbue

## 2017-02-23 NOTE — IP AVS SNAPSHOT
[address] [facility]    [address] [place] [address]    Phone:  [phone]                                       After Visit Summary   2/23/2017    Dung López    MRN: [id number]           After Visit Summary Signature Page     I have received my discharge instructions, and my questions have been answered. I have discussed any challenges I see with this plan with the nurse or doctor.    ..........................................................................................................................................  Patient/Patient Representative Signature      ..........................................................................................................................................  Patient Representative Print Name and Relationship to Patient    ..................................................               ................................................  Date                                            Time    ..........................................................................................................................................  Reviewed by Signature/Title    ...................................................              ..............................................  Date                                                            Time

## 2017-02-23 NOTE — ED NOTES
Seen in Clinic by Dr. Waterman today for not feeling well and shortness of breath. He was sent to the ER for further eval and with the understanding that he will be admitted for a few days.

## 2017-02-23 NOTE — ED PROVIDER NOTES
"  History     Chief Complaint   Patient presents with     Shortness of Breath     The history is provided by the patient and medical records.     Dung López is a 89 year old male who presents to the emergency department with shortness of breath. He states that for the last week he has been having chest pain/tightness and shortness of breath with little activity. The shortness of breath is described as a \"pressing\" in his right upper lung. He made an appointment with Dr. Waterman for today and had a chest X-ray done. Patient says he was told it looks like he has pneumonia and fluid build up. Currently, he feels \"pretty good\".  He says he usually goes to the gym daily and walks on the treadmill but 3-4 days ago he was unable to perform his normal activity. He states he moved to a different machine that requires less work which he did okay with. After needing to do this, he says he knew this was when he needed to be seen and he made the appointment for today with Dr. Waterman. He also notes nausea for a week and a half. Patient denies fever or chills. He notes a history of lower extremities edema which a \"Lasix tablet is able to clear\". He notes a history of heart bypass in 2009 and mitral insufficiency, but he states his heart feels the same as always.    Nurse Note:  Seen in Clinic by Dr. Waterman today for not feeling well and shortness of breath. He was sent to the ER for further eval and with the understanding that he will be admitted for a few days.    Results from his 9/30/15 Echocardiogram:  Interpretation Summary     There is mild concentric left ventricular hypertrophy.  Left ventricular systolic function is normal.  E by E prime ratio is greater than 15, that likely suggests increased left  ventricular filling pressures.  The right ventricular systolic function is borderline reduced.  The left atrium is moderate to severely dilated.  The right atrium is moderately dilated.  The mitral valve leaflets appear thickened, " but open well.  There is moderate to mod-severe (2-3+) mitral regurgitation.  MR postero-lateral directed with some Coanda effect. The leaflets do not  fully coapt due to annular dilatation  There is mild (1+) aortic regurgitation.  Borderline aortic root dilatation.  Borderline dilated PA      I have reviewed the Medications, Allergies, Past Medical and Surgical History, and Social History in the Epic system.    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     Rheumatic aortic insufficiency     DDD (degenerative disc disease), lumbar     Elevated glucose     Iron deficiency anemia     CKD (chronic kidney disease) stage 3, GFR 30-59 ml/min     HYPERLIPIDEMIA LDL GOAL <100     Advanced directives, counseling/discussion     Metatarsalgia     Esophageal reflux     Rosacea     Chronic systolic congestive heart failure (H)     Atherosclerosis of native coronary artery of native heart without angina pectoris     Past Medical History   Diagnosis Date     Atrial fibrillation (H) 06/24/09     New onset     Congestive heart failure, unspecified 7/14/2009     Admit.       Coronary atherosclerosis of native coronary artery      Other and unspecified angina pectoris 06/24/09     Transfer to [facility]     Personal history of other malignant neoplasm of skin      Pure hypercholesterolemia      Type II or unspecified type diabetes mellitus without mention of complication, not stated as uncontrolled 7/17/2009     this was suspected but pt has never been mediacted for DM       Past Surgical History   Procedure Laterality Date      repair ing hernia,5+y/o,reducibl  1989     C excis infratent meningioma  1997     [facility] left heart catheterization  03/16/2005     Left heart catheterization with selective coronary angiography and bilateral selective renal angiography.  [facility].      Colonoscopy  12/20/2006     Melanosis coli.  Internal hemorrhoids.     Hc inj transforamin epidural, lumb/sacr ea addtl  2008     Hc inj transforamin epidural, lumb/sacr single  2009     Cardiac catherization  6/10/2009     [facility]     Carotid endarterectomy       Coronary artery bypass  7/2/2009     Triple coronary artery bypass grafting. [facility].     Hc remv cataract extracap,insert lens       Bilateral       No family history on file.    Social History   Substance Use Topics     Smoking status: Former Smoker     Packs/day: 0.40     Years: 5.00     Quit date: 1/1/1949     Smokeless tobacco: Never Used     Alcohol use No        Immunization History   Administered Date(s) Administered     Influenza (H1N1) 12/29/2009     Influenza (High Dose) 3 valent vaccine 10/01/2013     Influenza (IIV3) 10/17/1995, 10/16/1996, 10/21/1997, 10/15/1998, 10/18/1999, 11/10/2000, 10/16/2001, 10/23/2002, 10/19/2004, 10/15/2007, 09/25/2009, 09/01/2012, 09/05/2014     Pneumococcal 23 valent 09/25/2009     TD (ADULT, 7+) 11/22/1999, 04/26/2010     Tdap (Adacel,Boostrix) 01/08/2013          Allergies   Allergen Reactions     Lisinopril [Ace Inhibitors] Cough     Zetia [Ezetimibe] Rash       Current Outpatient Prescriptions   Medication Sig Dispense Refill     amLODIPine (NORVASC) 10 MG tablet Take 0.5 tablets (5 mg) by mouth daily 90 tablet 3     ACCU-CHEK LURDES PLUS test strip TEST UP TO 4 TIMES DAILY 100 each 9     furosemide (LASIX) 20 MG tablet TAKE ONE TABLET BY MOUTH ONCE DAILY AS NEEDED 30 tablet 5     aspirin 81 MG tablet Take 1 tablet (81 mg) by mouth daily 30 tablet      artificial saliva, BIOTENE MT, (BIOTENE MT) SOLN Swish and spit 5 mLs in mouth as needed for dry mouth 1 Bottle 5     atorvastatin (LIPITOR) 80 MG tablet Take 0.5 tablets (40 mg) by mouth daily 30 tablet      metoprolol (TOPROL-XL) 50 MG 24 hr tablet Take 1/2 tab every am and 1/2 tab every pm 135 tablet 1     ACCU-CHEK MULTICLIX LANCETS MISC 2-4  daily as needed for changing blood sugars 200 each 3     ferrous sulfate 325 (65 FE) MG tablet Take 1 tablet by mouth daily (with breakfast).       Calcium Citrate-Vitamin D (CITRACAL + D PO) Take 2 tablets by mouth daily.       Cholecalciferol (VITAMIN D PO) Take 1 tablet by mouth daily.       VALSARTAN 40 MG PO TABS 1/2 TABLET IN AM  1     VITAMIN B-12 1000 MCG PO TABS 2 daily 100 Tab 11     [DISCONTINUED] amLODIPine (NORVASC) 5 MG tablet Take 0.5 tablets (2.5 mg) by mouth daily 90 tablet 3     Carbamide Peroxide (EAR DROPS OT) Place  in ear(s). As needed         Review of Systems   Constitutional: Negative for chills and fever.   Respiratory: Positive for chest tightness and shortness of breath. Negative for wheezing and stridor.    Cardiovascular: Positive for chest pain. Negative for palpitations and leg swelling.   Gastrointestinal: Positive for nausea. Negative for diarrhea and vomiting.   Genitourinary: Negative for decreased urine volume, difficulty urinating and frequency.   Musculoskeletal: Negative for arthralgias and myalgias.   Skin: Negative for pallor and rash.   Neurological: Negative for dizziness, weakness and light-headedness.   All other systems reviewed and are negative.      Physical Exam   BP: 140/63  Heart Rate: 64  Temp: 96.9  F (36.1  C)  Weight: 71.6 kg (157 lb 14.4 oz)  SpO2: 99 %    Physical Exam   Constitutional: He is oriented to person, place, and time. He appears well-developed and well-nourished.   HENT:   Head: Normocephalic and atraumatic.   Eyes: Conjunctivae and EOM are normal.   Neck: Normal range of motion.   Cardiovascular: Normal rate, regular rhythm and intact distal pulses.    Murmur heard.  Pulmonary/Chest: Effort normal. No respiratory distress. He has no wheezes. He has rales.   Abdominal: Soft. Bowel sounds are normal. He exhibits no distension. There is no tenderness.   Musculoskeletal: Normal range of motion.   Neurological: He is alert and oriented to person,  place, and time.   Skin: Skin is warm and dry.   Psychiatric: He has a normal mood and affect. His behavior is normal.   Nursing note and vitals reviewed.      ED Course     ED Course     Procedures             EKG Interpretation:      Interpreted by Dev Thorpe  Time reviewed:  4:39 PM   Symptoms at time of EKG: shortness of breath and chest tightness   Rhythm: Sinus rhythm with 1 degree AV block  Rate: Normal (61)   Axis: Normal  Ectopy: none  Conduction: 1st degree AV block with CT interval 232   ST Segments/ T Waves: No ST-T wave changes and No acute ischemic changes  Q Waves: none  Comparison to prior: Unchanged from Sept 2015    Clinical Impression: sinus rhythm with first degree AV block ( ms)      Troponin today from clinic was normal    BNP today from clinic was elevated at 8601.     Results for orders placed or performed in visit on 02/23/17 (from the past 24 hour(s))   XR Chest 2 Views    Narrative    CHEST TWO VIEWS   2/23/2017 2:10 PM     HISTORY: Cough. Shortness of breath.    COMPARISON: Chest x-rays dated 11/9/2011.    FINDINGS:  Since the prior study, there has been development of patchy  opacity in the left base posteriorly. There are increased interstitial  markings and there are small bilateral pleural fluid collections and  the heart is mildly enlarged. Sternal cerclage wires and mediastinal  vascular clips are noted consistent with prior CABG. No pneumothorax  is seen.      Impression    IMPRESSION: Findings are most consistent with congestive heart failure  with possible left basilar infiltrate superimposed upon the vascular  congestion.    I discussed these findings with Dr. Waterman on 2/23/2017 at approximately  3:00 PM.    ROYER WATKINS MD       Medications   furosemide (LASIX) injection 40 mg (not administered)        Assessments & Plan (with Medical Decision Making)  Dung came to the emergency department from the [facility].  Dr. Dev Waterman saw this patient in clinic today  and was concerned that he has congestive heart failure and perhaps pneumonia.  The patient's been having 1 week symptoms of increasing shortness of breath with chest tightness.  The patient has an underlying medical condition of aortic stenosis and mitral valve regurg.  His exam today shows significant heart murmurs and rales throughout both lung fields.  His chest x-ray shows congestive heart failure with possible left basilar infiltrate.  His BNP is 8600 and his troponin is normal.  The patient is medically stable here in the ED with oxygen saturations of 93-96% on room air and systolic blood pressure of 140 mmHg.  Because of this patient's aortic stenosis and mitral valve regurgitation I think he is going to be very volume dependent and requires cautious diuresis.  I spoke with Dr. Toni Taylor, hospitalist, about bringing this patient into the hospital.  Regular Debra after 1 dose of IV Lasix here in the ED.  Dr. Taylor will manage this patient's congestive heart failure on the inpatient service.  I will write observation orders.       I have reviewed the nursing notes.    I have reviewed the findings, diagnosis, plan and need for follow up with the patient.    New Prescriptions    No medications on file       Final diagnoses:   Acute on chronic congestive heart failure, unspecified congestive heart failure type (H)       This document serves as a record of services personally performed by Dev Thorpe MD. It was created on their behalf by Radha Taylor, a trained medical scribe. The creation of this record is based on the provider's personal observations and the statements of the patient. This document has been checked and approved by the attending provider.     Note: Chart documentation done in part with Dragon Voice Recognition software. Although reviewed after completion, some word and grammatical errors may remain.    2/23/2017   [facility] EMERGENCY DEPARTMENT     Dev Thorpe  MD Chris  02/23/17 [id number]

## 2017-02-24 PROBLEM — J81.0 ACUTE PULMONARY EDEMA (H): Status: ACTIVE | Noted: 2017-01-01

## 2017-02-24 PROBLEM — I48.0 PAROXYSMAL ATRIAL FIBRILLATION (H): Status: ACTIVE | Noted: 2017-01-01

## 2017-02-24 PROBLEM — I50.33 ACUTE ON CHRONIC DIASTOLIC CONGESTIVE HEART FAILURE (H): Status: ACTIVE | Noted: 2017-01-01

## 2017-02-24 PROBLEM — I51.7 LVH (LEFT VENTRICULAR HYPERTROPHY): Status: ACTIVE | Noted: 2017-01-01

## 2017-02-24 NOTE — PROGRESS NOTES
S-(situation): shift note    B-(background): Acute on chronic congestive heart failure    A-(assessment): Pt is alert and oriented. Vitals stable. O2 sats 98% on room air. 1500 urine output over night. Pt denies chest pain or shortness of breath.    R-(recommendations): Continue to monitor.

## 2017-02-24 NOTE — H&P
[facility]    History and Physical  Hospitalist       Date of Admission:  2/23/2017  Date of Service (when I saw the patient): 02/24/17    Assessment & Plan       Active Problems:    Acute on chronic diastolic congestive heart failure (H)    Assessment: mild symptoms with mostly exertional dyspnea.  EKG without ischemic changes and troponin normal.  Slightly more swollen from his baseline and BNP elevated.  ED thought he should be monitored initially for diuresis due to his age.    Plan: register to observation, IV lasix already given with good results, resume po lasix later this morning, update his echocardiogram, continue ARB and beta blocker      Rheumatic mitral insufficiency    Assessment: noted on previous echo    Plan: as above      Rheumatic aortic insufficiency    Assessment: mild disease    Plan: f/u echo      Iron deficiency anemia    Assessment: stable    Plan: no intervention      CKD (chronic kidney disease) stage 3, GFR 30-59 ml/min    Assessment: stable    Plan: no intervention      CAD (coronary artery disease) - CABG in 2009    Assessment: initially describes some CP but really more SOB with activity    Plan: no acute intervention      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    Andres Taylor MD    Primary Care Physician   Mikieraymond Ibanez    Chief Complaint   SOB    History is obtained from the patient    History of Present Illness   Dung López is a 89 year old male who presents with SOB.  He has noticed slightly more swelling in his ankles and has been more SOB with activity in the last week.  He denies cough, fever, chills or  sweats.  He has had some chest pain but on further questioning is really more SOB with activity.  He presented to the clinic and then sent to the ED.     Past Medical History    I have reviewed this patient's medical history and updated it with pertinent information if needed.   Past Medical History   Diagnosis Date     Atrial fibrillation (H) 06/24/09     New onset     Congestive heart failure, unspecified 7/14/2009     Admit.       Coronary atherosclerosis of native coronary artery      Other and unspecified angina pectoris 06/24/09     Transfer to [facility]     Personal history of other malignant neoplasm of skin      Pure hypercholesterolemia      Type II or unspecified type diabetes mellitus without mention of complication, not stated as uncontrolled 7/17/2009     this was suspected but pt has never been mediacted for DM       Past Surgical History   I have reviewed this patient's surgical history and updated it with pertinent information if needed.  Past Surgical History   Procedure Laterality Date     Hc repair ing hernia,5+y/o,reducibl  1989     C excis infratent meningioma  1997     [facility] left heart catheterization  03/16/2005     Left heart catheterization with selective coronary angiography and bilateral selective renal angiography.  [facility].     Colonoscopy  12/20/2006     Melanosis coli.  Internal hemorrhoids.     Hc inj transforamin epidural, lumb/sacr ea addtl  2008     Hc inj transforamin epidural, lumb/sacr single  2009     Cardiac catherization  6/10/2009     [facility]     Carotid endarterectomy       Coronary artery bypass  7/2/2009     Triple coronary artery bypass grafting. [facility].     Hc remv cataract extracap,insert lens       Bilateral       Prior to Admission Medications   Prior to Admission Medications   Prescriptions Last Dose Informant Patient Reported? Taking?   ACCU-CHEK LURDES PLUS test strip Past Week at Unknown time  No Yes   Sig:  TEST UP TO 4 TIMES DAILY   ACCU-CHEK MULTICLIX LANCETS MISC Past Week at Unknown time  No Yes   Si-4 daily as needed for changing blood sugars   Calcium Citrate-Vitamin D (CITRACAL + D PO) 2017 at 0800  Yes Yes   Sig: Take 2 tablets by mouth daily.   Carbamide Peroxide (EAR DROPS OT) Unknown at Unknown time  Yes No   Sig: Place  in ear(s). As needed   Cholecalciferol (VITAMIN D PO) 2017 at 0800  Yes Yes   Sig: Take 1 tablet by mouth daily.   VALSARTAN 40 MG PO TABS 2017 at 0800  Yes Yes   Si/2 TABLET IN AM   VITAMIN B-12 1000 MCG PO TABS 2017 at 0800  No Yes   Si daily   amLODIPine (NORVASC) 10 MG tablet 2017 at 0800  Yes Yes   Sig: Take 0.5 tablets (5 mg) by mouth daily   artificial saliva, BIOTENE MT, (BIOTENE MT) SOLN 2017 at 0800  No Yes   Sig: Swish and spit 5 mLs in mouth as needed for dry mouth   aspirin 81 MG tablet 2017 at 0800  Yes Yes   Sig: Take 1 tablet (81 mg) by mouth daily   atorvastatin (LIPITOR) 80 MG tablet 2017 at 0800  Yes Yes   Sig: Take 0.5 tablets (40 mg) by mouth daily   ferrous sulfate 325 (65 FE) MG tablet 2017 at 0800  Yes Yes   Sig: Take 1 tablet by mouth daily (with breakfast).   furosemide (LASIX) 20 MG tablet Past Week at Unknown time  No Yes   Sig: TAKE ONE TABLET BY MOUTH ONCE DAILY AS NEEDED   metoprolol (TOPROL-XL) 50 MG 24 hr tablet 2017 at 0800  Yes Yes   Sig: Take 50 mg by mouth daily Take 1/2 tab every am and 1/2 tab every pm      Facility-Administered Medications: None     Allergies   Allergies   Allergen Reactions     Lisinopril [Ace Inhibitors] Cough     Zetia [Ezetimibe] Rash       Social History   I have reviewed this patient's social history and updated it with pertinent information if needed. Dung López  reports that he quit smoking about 68 years ago. He has a 2.00 pack-year smoking history. He has never used smokeless tobacco. He reports that he does not drink alcohol or use illicit drugs.    Family  History   I have reviewed this patient's family history and updated it with pertinent information if needed.   No family history on file.    Review of Systems   The 10 point Review of Systems is negative other than noted in the HPI or here.     Physical Exam   Temp: 96.5  F (35.8  C) Temp src: Oral BP: 144/62 Pulse: 71 Heart Rate: 71 Resp: 20 SpO2: 98 % O2 Device: None (Room air)    Vital Signs with Ranges  Temp:  [96.5  F (35.8  C)-96.9  F (36.1  C)] 96.5  F (35.8  C)  Pulse:  [64-72] 71  Heart Rate:  [64-71] 71  Resp:  [16-20] 20  BP: (120-152)/(46-99) 144/62  SpO2:  [92 %-100 %] 98 %  157 lbs 13.59 oz    Constitutional:   awake, alert, cooperative, no apparent distress, and appears stated age     Eyes:   Lids and lashes normal, pupils equal, round and reactive to light, extra ocular muscles intact, sclera clear, conjunctiva normal     ENT:   Normocephalic, without obvious abnormality, atraumatic, sinuses nontender on palpation, external ears without lesions, oral pharynx with moist mucous membranes, tonsils without erythema or exudates, gums normal and good dentition.     Neck:   Supple, symmetrical, trachea midline, no adenopathy, thyroid symmetric, not enlarged and no tenderness, skin normal     Hematologic / Lymphatic:   no cervical lymphadenopathy and no supraclavicular lymphadenopathy     Back:   Symmetric, no curvature, spinous processes are non-tender on palpation, paraspinous muscles are non-tender on palpation, no costal vertebral tenderness     Lungs:   No increased work of breathing, good air exchange, clear to auscultation bilaterally, no crackles or wheezing     Cardiovascular:   Normal apical impulse, regular rate and rhythm, normal S1 and S2, no S3 or S4, and 3/6 systolic murmur noted throughout the precordium     Abdomen:   normal bowel sounds, soft, non-distended, non-tender, no masses palpated, no hepatosplenomegally     Neurologic:   Awake, alert, oriented to name, place and time.  Cranial  nerves II-XII are grossly intact.  Motor is 5 out of 5 bilaterally.   Sensory is intact.         Data   Data reviewed today:  I personally reviewed the EKG tracing showing NSR with no acute ischemic changes.    Recent Labs  Lab 02/23/17  1403   WBC 7.8   HGB 10.3*   MCV 99      TROPI <0.015The 99th percentile for upper reference range is 0.045 ug/L.  Troponin values in the range of 0.045 - 0.120 ug/L may be associated with risks of adverse clinical events.       Recent Results (from the past 24 hour(s))   XR Chest 2 Views    Narrative    CHEST TWO VIEWS   2/23/2017 2:10 PM     HISTORY: Cough. Shortness of breath.    COMPARISON: Chest x-rays dated 11/9/2011.    FINDINGS:  Since the prior study, there has been development of patchy  opacity in the left base posteriorly. There are increased interstitial  markings and there are small bilateral pleural fluid collections and  the heart is mildly enlarged. Sternal cerclage wires and mediastinal  vascular clips are noted consistent with prior CABG. No pneumothorax  is seen.      Impression    IMPRESSION: Findings are most consistent with congestive heart failure  with possible left basilar infiltrate superimposed upon the vascular  congestion.    I discussed these findings with Dr. Waterman on 2/23/2017 at approximately  3:00 PM.    ROYER WATKINS MD

## 2017-02-24 NOTE — TELEPHONE ENCOUNTER
Nurse called this afternoon to set up hospital follow up appointment.  There were no openings but he needs to be seen within 7 days.  Please contact this patient.    Thank you!  Barbara CLAYTON  Central Scheduler

## 2017-02-24 NOTE — PROGRESS NOTES
"S-(situation): Patient registered to Observation. Patient arrived to room 252 via wheelchair from ED    B-(background): CHF    A-(assessment): /62  Pulse 71  Temp 96.5  F (35.8  C) (Oral)  Resp 20  Ht 1.753 m (5' 9\")  Wt 71.6 kg (157 lb 13.6 oz)  SpO2 98%  BMI 23.31 kg/m2 Pt is alert and oriented.  Lungs diminished with crackles in the bases. Skin intact. Pt oriented to room.    R-(recommendations): Orders and observation goals reviewed with pt    Nursing Observation criteria listed below was met:    Skin issues/needs documented:NA  Isolation needs addressed, if appropriate: NA  Fall Prevention: Education given and documented: Yes  Education Assessment documented:Yes  Education Documented (Pre-existing chronic infection such as, MRSA/VRE need education on admission): Yes  New medication patient education completed and documented (Possible Side Effects of Common Medications handout): Yes  Home medications if not able to send immediately home with family stored here: NA  Reminder note placed in discharge instructions: NA  Patient has discharge needs (If yes, please explain): No            "

## 2017-02-24 NOTE — PROGRESS NOTES
S-(situation): Patient discharged to home with wife    B-(background): Observation goals met     A-(assessment): obs goals met with an understanding of CHF; booklet given.    R-(recommendations): Discharge instructions reviewed with patient and wife. Listed belongings gathered and returned to patient.  Patient Education resolved: Yes  New medications-Pt. Has been educated about reason of use and side effects Yes  Home and hospital acquired medications returned to patient Yes  Medication Bin checked and emptied on discharge Yes

## 2017-02-24 NOTE — DISCHARGE SUMMARY
[facility] Observation Discharge Summary    Dung López MRN# [id number]   Age: 89 year old YOB: 1926     Date of Observation:  2/23/2017  Date of Discharge:  2/24/2017   Initial Physician:  Andres Taylor MD  Discharge Physician:  iSn Lozano MD     Home clinic: [facility]  Primary care provider: Mikie Ibanez          Admission Diagnoses:   Acute on chronic congestive heart failure, unspecified congestive heart failure type (H) [I50.9]          Discharge Diagnoses:   Principal diagnosis: Acute pulmonary edema (H)    Secondary diagnoses:    Acute pulmonary edema (H)    Moderate to severe mitral regurgitation    Rheumatic aortic insufficiency    CKD (chronic kidney disease) stage 3, GFR 30-59 ml/min    CAD (coronary artery disease) - CABG in 2009    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 *            Procedures:   No procedures performed during this hospital stay             Discharge Medications:     Outpatient Prescriptions Marked as Taking for the 2/23/17 encounter (Hospital Encounter)   Medication Sig     metoprolol (TOPROL-XL) 50 MG 24 hr tablet Take 1/2 tab every am and 1/2 tab every pm     furosemide (LASIX) 20 MG tablet Take 1 tablet (20 mg) by mouth daily     amLODIPine (NORVASC) 10 MG tablet Take 0.5 tablets (5 mg) by mouth daily     ACCU-CHEK LURDES PLUS test strip TEST UP TO 4 TIMES DAILY     aspirin 81 MG tablet Take 1 tablet (81 mg) by mouth daily     artificial saliva, BIOTENE MT, (BIOTENE MT) SOLN Swish and spit 5 mLs in mouth as needed for dry mouth     atorvastatin (LIPITOR) 80 MG tablet Take 0.5 tablets (40 mg) by mouth daily     ACCU-CHEK MULTICLIX LANCETS MISC 2-4 daily as needed for changing blood sugars     ferrous sulfate 325 (65 FE) MG tablet Take 1 tablet by mouth daily (with breakfast).     Calcium  Citrate-Vitamin D (CITRACAL + D PO) Take 2 tablets by mouth daily.     Cholecalciferol (VITAMIN D PO) Take 1 tablet by mouth daily.     VALSARTAN 40 MG PO TABS 1/2 TABLET IN AM     VITAMIN B-12 1000 MCG PO TABS 2 daily       Current Discharge Medication List      CONTINUE these medications which have CHANGED    Details   metoprolol (TOPROL-XL) 50 MG 24 hr tablet Take 1/2 tab every am and 1/2 tab every pm  Qty: 135 tablet, Refills: 1      furosemide (LASIX) 20 MG tablet Take 1 tablet (20 mg) by mouth daily  Qty: 30 tablet, Refills: 0    Associated Diagnoses: Chronic systolic congestive heart failure (H)         CONTINUE these medications which have NOT CHANGED    Details   amLODIPine (NORVASC) 10 MG tablet Take 0.5 tablets (5 mg) by mouth daily  Qty: 90 tablet, Refills: 3      ACCU-CHEK LURDES PLUS test strip TEST UP TO 4 TIMES DAILY  Qty: 100 each, Refills: 9    Associated Diagnoses: Elevated glucose      aspirin 81 MG tablet Take 1 tablet (81 mg) by mouth daily  Qty: 30 tablet      artificial saliva, BIOTENE MT, (BIOTENE MT) SOLN Swish and spit 5 mLs in mouth as needed for dry mouth  Qty: 1 Bottle, Refills: 5    Associated Diagnoses: Dry mouth      atorvastatin (LIPITOR) 80 MG tablet Take 0.5 tablets (40 mg) by mouth daily  Qty: 30 tablet      ACCU-CHEK MULTICLIX LANCETS MISC 2-4 daily as needed for changing blood sugars  Qty: 200 each, Refills: 3    Associated Diagnoses: Elevated glucose      ferrous sulfate 325 (65 FE) MG tablet Take 1 tablet by mouth daily (with breakfast).      Calcium Citrate-Vitamin D (CITRACAL + D PO) Take 2 tablets by mouth daily.      Cholecalciferol (VITAMIN D PO) Take 1 tablet by mouth daily.      VALSARTAN 40 MG PO TABS 1/2 TABLET IN AM  Refills: 1    Associated Diagnoses: Coronary atherosclerosis of unspecified type of vessel, native or graft; Congenital anomaly of aortic arch; Rheumatic mitral insufficiency      VITAMIN B-12 1000 MCG PO TABS 2 daily  Qty: 100 Tab, Refills: 11     Associated Diagnoses: Anemia, unspecified      Carbamide Peroxide (EAR DROPS OT) Place  in ear(s). As needed                   Consultations:   No consultations were requested during this hospital stay            Brief History of Illness:   89 year old male patient with rheumatic valvular heart disease, coronary artery disease, diabetes, and atrial fibrillation presented with several days history of worsening exertional dyspnea and ankle swelling.   Because of concerns for pulmonary edema and possible congestive heart failure, he was hospitalized. See ER note and history and physical for details.          Hospital Course:   Patient was observed in the hospital.  Chest x-ray demonstrated findings consistent with pulmonary edema.  Troponin was normal.  BNP was elevated.  There were no acute ischemic changes on EKG.  He had no dysrhythmias on telemetry monitoring, and EKG demonstrated sinus rhythm with first-degree AV block.  Although a superimposed infiltrate could not be excluded radiographically on chest x-ray, he did not have fever or leukocytosis nor did he have other clinical symptoms suspicious for pneumonia.  Echocardiogram demonstrated normal LV ejection fraction with normal right ventricle size and function.  Mild LVH was present, but there was no mention of diastolic dysfunction.  Mitral valve chordae were abnormal and there was moderately severe to severe mitral regurgitation with an eccentric jet.  Moderate pulmonary hypertension was also described.  After one dose of IV Lasix, he had brisk diuresis with dramatic clinical improvement.  He was able to tolerate advancing activity without significant exertional dyspnea by discharge.  He was advised to use oral Lasix daily rather than as needed and to monitor daily weights.  A 2 g sodium diet was recommended along with his usual diabetic diet.  After investigation, recently worsening pulmonary edema due to rheumatic mitral valve disease with severe mitral  regurgitation was suspected.    I evaluated this patient on the day of discharge.  Vital signs were stable and oxygenation was normal in room air.  Respiratory effort was normal.  Air movement was good and lung fields were clear.  Heart rate was regular with regular rhythm.  Mild peripheral edema was present.            Discharge Instructions and Follow-Up:   Discharge diet: Moderate consistent carbohydrate (2216-6796 mati / 4-6 CHO units per meal)  2g salt   Discharge activity: Activity as tolerated   Discharge follow-up: Follow up with primary care provider in 1 week      Pending test results: None         Discharge Disposition:   Discharged to home      Attestation:  I have reviewed today's vital signs, notes, medications, and test results.    Sin Lozano MD

## 2017-02-24 NOTE — TELEPHONE ENCOUNTER
Apt scheduled for Friday , 3/3/17/ at 10:30 am and nurses station states they will inform patient as he has not left yet VORB Dr. Mikie Ibanez/Jasmin Kumar CMA ([facility])

## 2017-02-28 NOTE — LETTER
My Heart Failure Action Plan   Name: Dung López    YOB: 1926   Date: 2/28/2017    My doctor: Mikie Ibanez   [address]   [address]  Tel. [phone] / Fax [phone]  My Diagnosis: Combined Heart Failure   My Ejection Fraction: Over 50%    My Exercise Goal: 30 minutes daily  .     My Weight Goal: 145 pounds on home scale  Wt Readings from Last 2 Encounters:   02/23/17 157 lb 13.6 oz (71.6 kg)   02/23/17 156 lb 14.4 oz (71.2 kg)     Weigh yourself daily using the same scale. If you gain more than 2 pounds in 24 hours or 5 pounds in a week call the clinic    My Diet Goal: No added salt and 2,000 mg of sodium    Emergency Room Visits:    Our goal is to improve your quality of life and help you avoid a visit to the emergency room or hospital.  If we work together, we can achieve this goal. But, if you feel you need to call 911 or go to the emergency room, please do so.  If you go to the emergency room, please bring your list of medicines and your daily weight chart with you.       GREEN ZONE     Doing well today    Weight gained is no more than 2 pounds a day or 5 pounds a week.    No swelling in feet, ankles, legs or stomach.    No more swelling than usual.    No more trouble breathing than usual.    No change in my sleep.    No other problems. Actions:    I am doing fine.  I will take my medicine, follow my diet, see my doctor, exercise, and watch for symptoms.           YELLOW ZONE         Having a bad day or flare up    Weight gain of more than 2 pounds in one day or 5 pounds in one week.    New swelling in ankle, leg, knee or thigh.    Bloating in belly, pants feel tighter.    Swelling in hands or face.    Coughing or trouble breathing while walking or talking.    Harder to breathe last night.    Have trouble sleeping, wake up short of breath.    Much more tired than usual.    Not eating.    Pain in my chest or bad leg  cramps.    Feel weak or dizzy. Actions:    I need to take action and call my doctor or nurse today.                 RED ZONE         Need medical care now    Weight gain of 5 pounds overnight.    Chest pain or pressure that does not go away.    Feel less alert.    Wheezing or have trouble breathing when at rest.    Cannot sleep lying down.    Cannot take my water pill.    Pass out or faint. Actions:    I need to call my doctor or nurse now!    Call 911 if I have chest pain or cannot breathe.

## 2017-02-28 NOTE — PROGRESS NOTES
Clinic Care Coordination Contact  [facility]/Voicemail    Referral Source: [facility]  Clinical Data: Care Coordinator Outreach  Outreach attempted x 1.  Left message on voicemail with wife to have patient call back  Care Coordinator will try to reach patient again in 1business days.      Susy Tariq RN,BSN  Clinical Care Coordinator    [facility] [phone]  [facility] [phone]

## 2017-02-28 NOTE — PROGRESS NOTES
Clinic Care Coordination Contact  OUTREACH    Referral Information:  Referral Source: CTS  Reason for Contact: Hospital follow up HF        Universal Utilization:   ED Visits in last year: 1  Hospital visits in last year: 1  Last PCP appointment: 07/18/16  Missed Appointments: 2     [facility] Prov[facility]    Clinical Concerns:  Current Medical Concerns: Spoke with pt, states he is doing quite well. He was just out visiting a friend at the [place] home.  States he has not had any chest pain or sob since he has been home. He does weigh himself daily and states he is stable at 145 on his home scale. He has been taking his lasix every day now and feels this is contributing to his more stable weights. Discussed diabetes with pt, states back in 2009 after his heart surgery he was sent home with a meter and strips and told to check everyday.  States he checks daily and his blood sugars run around 100-101 pretty constantly. He is not on any diabetic medications or insulin. His last A1c was 5.9 back in 2010. In reviewing the labs from [facility] there is also no record of any updated A1c.  Pt states he just sticks to a diabetic diet and goes to the gym every morning and he does fine. Given pt's age and long term stability of blood sugars suggested talking with his provider about discontinuing checking his blood sugars following another normal A1c. Pt does have a hospital follow up appt for 3/3/17.  Will send out information on Care Coordination and follow up in a week.    Current Behavioral Concerns: no current concerns     Clinical Pathway Name: Heart Failure    Medication Management:  Pt sets up and self administers medications     Functional Status:  Mobility Status: Independent     Transportation: drives in town     Psychosocial:  Current living arrangement:: I live in a private home with spouse  Financial/Insurance: no current concerns     Resources and Interventions:  Current Resources:  no current needs        Advanced Care Plans/Directives on file:: Yes        Goals: TBD  Frequency of Care Coordination:  (weekly)  Upcoming appointment: 03/03/17     Plan:   Pt will continue to take medications as prescribed  Pt will continue to weigh himself daily  Pt will follow up at next appt 3/3/17

## 2017-03-03 PROBLEM — I48.0 PAROXYSMAL ATRIAL FIBRILLATION (H): Status: RESOLVED | Noted: 2017-01-01 | Resolved: 2017-01-01

## 2017-03-03 PROBLEM — J81.0 ACUTE PULMONARY EDEMA (H): Status: RESOLVED | Noted: 2017-01-01 | Resolved: 2017-01-01

## 2017-03-03 NOTE — NURSING NOTE
"Chief Complaint   Patient presents with     Hospital F/U       Initial /46 (BP Location: Left arm, Patient Position: Chair, Cuff Size: Adult Regular)  Pulse 68  Temp 96.5  F (35.8  C) (Temporal)  Resp 16  Wt 151 lb (68.5 kg)  SpO2 100%  BMI 22.3 kg/m2 Estimated body mass index is 22.3 kg/(m^2) as calculated from the following:    Height as of 2/23/17: 5' 9\" (1.753 m).    Weight as of this encounter: 151 lb (68.5 kg).  Medication Reconciliation: complete   Patient. Scheduled to see us for chronic conditions in April  "

## 2017-03-03 NOTE — PROGRESS NOTES
SUBJECTIVE:                                                    Dung López is a 89 year old male who presents to clinic today for the following health issues:          Hospital Follow-up Visit:    Hospital/Nursing Home/IP Rehab Facility: [facility]  Date of Admission: 2/23/17  Date of Discharge: 2/24/17  Reason(s) for Admission: CHF            Problems taking medications regularly:  None       Medication changes since discharge: None       Problems adhering to non-medication therapy:  None    Summary of hospitalization:  [facility] discharge summary reviewed  Diagnostic Tests/Treatments reviewed.  Follow up needed: none  Other Healthcare Providers Involved in Patient s Care:         None  Update since discharge: improved.     Post Discharge Medication Reconciliation: discharge medications reconciled and changed, per note/orders (see AVS).  Plan of care communicated with patient     Coding guidelines for this visit:  Type of Medical   Decision Making Face-to-Face Visit       within 7 Days of discharge Face-to-Face Visit        within 14 days of discharge   Moderate Complexity 53521 [id number]   High Complexity 42217 [id number]            Feels much better    Problem list and histories reviewed & adjusted, as indicated.  Additional history: as documented    BP Readings from Last 3 Encounters:   03/03/17 128/46   02/24/17 145/56   02/23/17 120/46    Wt Readings from Last 3 Encounters:   03/03/17 151 lb (68.5 kg)   02/23/17 157 lb 13.6 oz (71.6 kg)   02/23/17 156 lb 14.4 oz (71.2 kg)                  Labs reviewed in EPIC    Reviewed and updated as needed this visit by clinical staff       Reviewed and updated as needed this visit by Provider         ROS:  Constitutional, HEENT, cardiovascular, pulmonary, gi and gu systems are negative, except as otherwise noted.  Legs are less swollen now than they had been in the days prior to hospitalization. He is active and resumed fitness center  activities    OBJECTIVE:                                                    /46  Pulse 68  Temp 96.5  F (35.8  C) (Temporal)  Resp 16  Wt 151 lb (68.5 kg)  SpO2 100%  BMI 22.3 kg/m2  Body mass index is 22.3 kg/(m^2).  GENERAL: healthy, alert and no distress  EYES: Eyes grossly normal to inspection, PERRL and conjunctivae and sclerae normal  NECK: no adenopathy, no asymmetry, masses, or scars and thyroid normal to palpation  RESP: lungs clear to auscultation - no rales, rhonchi or wheezes  CV: Fairly prominent harsh systolic murmur. Otherwise regular rate  ABDOMEN: soft, nontender, no hepatosplenomegaly, no masses and bowel sounds normal  MS: no gross musculoskeletal defects noted, no edema  NEURO: Normal strength and tone, mentation intact and speech normal    Diagnostic Test Results:  No results found for this or any previous visit (from the past 24 hour(s)).     ASSESSMENT/PLAN:                                                    Heart Failure: Stage B - EF:Over 50%; chronic, controlled   Associated with the following complications    None   Plan:  No changes in the patient's current treatment plan          1. Chronic systolic congestive heart failure (H)  This is well controlled in the furosemide is helping. Will check basic profile today  - furosemide (LASIX) 20 MG tablet; Take 1 tablet (20 mg) by mouth daily  Dispense: 30 tablet; Refill: 5    2. CKD (chronic kidney disease) stage 3, GFR 30-59 ml/min  As above  - Basic metabolic panel    3. Iron deficiency anemia, unspecified iron deficiency anemia type  From chronic disease and no change needed    4. Coronary artery disease without angina pectoris, unspecified vessel or lesion type, unspecified whether native or transplanted heart  Well-controlled    5. Acute pulmonary edema (H)  Reason for hospitalization and after this visit will consider this resolved  - Basic metabolic panel    6. 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 [facility]      MEDICATIONS:  Continue current medications without change  Patient Instructions   Continue the same.       Mikie Dung Ibanez MD  [facility]

## 2017-03-03 NOTE — MR AVS SNAPSHOT
After Visit Summary   3/3/2017    Dung López    MRN: [id number]           Patient Information     Date Of Birth          4/1/1926        Visit Information        Provider Department      3/3/2017 10:30 AM Mikie Ibanez MD [facility]        Today's Diagnoses     CKD (chronic kidney disease) stage 3, GFR 30-59 ml/min    -  1    Chronic systolic congestive heart failure (H)        Iron deficiency anemia, unspecified iron deficiency anemia type        Coronary artery disease without angina pectoris, unspecified vessel or lesion type, unspecified whether native or transplanted heart        Acute pulmonary edema (H)        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         Follow-ups after your visit        Your next 10 appointments already scheduled     Apr 05, 2017  7:30 AM CDT   Office Visit with Mikie Ibanez MD   [facility]    [address] [place] [address] [id number] [phone]           Bring a current list of meds and any records pertaining to this visit.  For Physicals, please bring immunization records and any forms needing to be filled out.  Please arrive 10 minutes early to complete paperwork.              Who to contact     If you have questions or need follow up information about today's clinic visit or your schedule please contact [facility] directly at [phone].  Normal or non-critical lab and imaging results will be communicated to you by MyChart, letter or phone within 4 business days after the clinic has received the results. If you do not hear from us within 7 days, please contact the clinic through Yotpohart or phone. If you have a critical or abnormal lab result, we will notify you by phone as soon as possible.  Submit refill requests through [employer] or call your pharmacy and they will forward the refill request to us. Please allow 3 business  "days for your refill to be completed.          Additional Information About Your Visit        MyChart Information     [employer] lets you send messages to your doctor, view your test results, renew your prescriptions, schedule appointments and more. To sign up, go to [email or web address] . Click on \"Log in\" on the left side of the screen, which will take you to the Welcome page. Then click on \"Sign up Now\" on the right side of the page.     You will be asked to enter the access code listed below, as well as some personal information. Please follow the directions to create your username and password.     Your access code is: [email or web address]  Expires: 2017  3:22 PM     Your access code will  in 90 days. If you need help or a new code, please call your [place] clinic or [phone].        Care EveryWhere ID     This is your [facility] EveryWhere ID. This could be used by other organizations to access your [place] medical records  [id number]        Your Vitals Were     Pulse Temperature Respirations Pulse Oximetry BMI (Body Mass Index)       68 96.5  F (35.8  C) (Temporal) 16 100% 22.3 kg/m2        Blood Pressure from Last 3 Encounters:   17 128/46   17 145/56   17 120/46    Weight from Last 3 Encounters:   17 151 lb (68.5 kg)   17 157 lb 13.6 oz (71.6 kg)   17 156 lb 14.4 oz (71.2 kg)              We Performed the Following     Basic metabolic panel          Where to get your medicines      These medications were sent to [facility] [place] - [address]  [address], [place] [address]     Phone:  [phone]     furosemide 20 MG tablet          Primary Care Provider Office Phone # Fax #    Mikie Ibanez [phone]       [facility] [address] [place] [phone]        Thank you!     Thank you for choosing [facility]  for your care. Our goal is always to provide you with excellent care. Hearing " back from our patients is one way we can continue to improve our services. Please take a few minutes to complete the written survey that you may receive in the mail after your visit with us. Thank you!             Your Updated Medication List - Protect others around you: Learn how to safely use, store and throw away your medicines at www.disposemymeds.org.          This list is accurate as of: 3/3/17 10:37 AM.  Always use your most recent med list.                   Brand Name Dispense Instructions for use    ACCU-CHEK LURDES PLUS test strip   Generic drug:  blood glucose monitoring     100 each    TEST UP TO 4 TIMES DAILY       amLODIPine 10 MG tablet    NORVASC    90 tablet    Take 0.5 tablets (5 mg) by mouth daily       artificial saliva Soln solution     1 Bottle    Swish and spit 5 mLs in mouth as needed for dry mouth       aspirin 81 MG tablet     30 tablet    Take 1 tablet (81 mg) by mouth daily       blood glucose monitoring lancets     200 each    2-4 daily as needed for changing blood sugars       CITRACAL + D PO      Take 2 tablets by mouth daily.       cyanocobalamin 1000 MCG tablet    vitamin  B-12    100 Tab    2 daily       EAR DROPS OT      Place  in ear(s). As needed       ferrous sulfate 325 (65 FE) MG tablet    IRON     Take 1 tablet by mouth daily (with breakfast).       furosemide 20 MG tablet    LASIX    30 tablet    Take 1 tablet (20 mg) by mouth daily       LIPITOR 80 MG tablet   Generic drug:  atorvastatin     30 tablet    Take 0.5 tablets (40 mg) by mouth daily       metoprolol 50 MG 24 hr tablet    TOPROL-XL    135 tablet    Take 1/2 tab every am and 1/2 tab every pm       valsartan 40 MG tablet    DIOVAN     1/2 TABLET IN AM       VITAMIN D PO      Take 1 tablet by mouth daily.

## 2017-04-05 NOTE — PROGRESS NOTES
SUBJECTIVE:                                                    Dung López is a 89 year old male who presents to clinic today for the following health issues:        Diabetes Follow-up      Patient is checking blood sugars: 2 times a week    Diabetic concerns: None     Symptoms of hypoglycemia (low blood sugar): none     Paresthesias (numbness or burning in feet) or sores: Yes      Date of last diabetic eye exam: Patient states will schedule in [place]     Hyperlipidemia Follow-Up      Rate your low fat/cholesterol diet?: fair    Taking statin?  Yes, no muscle aches from statin    Other lipid medications/supplements?:  none     Hypertension Follow-up      Outpatient blood pressures are being checked at home.  Results are 120/55 Pulse: 60.    Low Salt Diet: no added salt       Vascular Disease Follow-up:  Coronary Artery Disease (CAD)      Chest pain or pressure, left side neck or arm pain: No    Shortness of breath/increased sweats/nausea with exertion: No    Pain in calves walking 1-2 blocks: No    Worsened or new symptoms since last visit: No    Nitroglycerin use: no    Daily aspirin use: Yes     Heart Failure Follow-up    Symptoms:    Shortness of breath: none    Lower extremity edema: none    Chest pain: No    Using more pillows than normal: No    Cough at night: Yes-      Weight:    Checking weight daily: Yes    Weight change: none    Cardiology visits, ER/UC, or hospital admissions since last visit: None    Medication side effects: none       Recheck Reflux:    Amount of exercise or physical activity: 6-7 days/week for an average of 15-30 minutes    Problems taking medications regularly: No    Medication side effects: none    Diet: low salt, low fat/cholesterol and diabetic      Patient really doing well with all his multiple problems. He really has no complaints today and obtained his 89st birthday a few days ago.    Problem list and histories reviewed & adjusted, as indicated.  Additional history: as  documented    BP Readings from Last 3 Encounters:   04/05/17 122/50   03/03/17 128/46   02/24/17 145/56    Wt Readings from Last 3 Encounters:   04/05/17 153 lb (69.4 kg)   03/03/17 151 lb (68.5 kg)   02/23/17 157 lb 13.6 oz (71.6 kg)                  Labs reviewed in EPIC    Reviewed and updated as needed this visit by clinical staff       Reviewed and updated as needed this visit by Provider         ROS:  Constitutional, HEENT, cardiovascular, pulmonary, gi and gu systems are negative, except as otherwise noted.    OBJECTIVE:                                                    /50 (BP Location: Left arm, Patient Position: Chair, Cuff Size: Adult Regular)  Pulse 64  Temp 97  F (36.1  C) (Temporal)  Resp 16  Wt 153 lb (69.4 kg)  SpO2 100%  BMI 22.59 kg/m2  Body mass index is 22.59 kg/(m^2).  GENERAL: healthy, alert and no distress  EYES: Eyes grossly normal to inspection, PERRL and conjunctivae and sclerae normal  NECK: no adenopathy, no asymmetry, masses, or scars and thyroid normal to palpation  RESP: lungs clear to auscultation - no rales, rhonchi or wheezes  CV: regular rates and rhythm, normal S1 S2, no S3 or S4, very prominent systolic murmur, click or rub, peripheral pulses strong and no peripheral edema  ABDOMEN: soft, nontender, no hepatosplenomegaly, no masses and bowel sounds normal  MS: no gross musculoskeletal defects noted, no edema  SKIN: no suspicious lesions or rashes  NEURO: Normal strength and tone, mentation intact and speech normal  PSYCH: mentation appears normal, affect normal/bright  Diabetic foot exam: normal DP and PT pulses, no trophic changes or ulcerative lesions and normal sensory exam    Diagnostic Test Results:  Results for orders placed or performed in visit on 04/05/17 (from the past 24 hour(s))   CBC with platelets   Result Value Ref Range    WBC 6.7 4.0 - 11.0 10e9/L    RBC Count 3.29 (L) 4.4 - 5.9 10e12/L    Hemoglobin 10.2 (L) 13.3 - 17.7 g/dL    Hematocrit 32.1 (L)  "40.0 - 53.0 %    MCV 98 78 - 100 fl    MCH 31.0 26.5 - 33.0 pg    MCHC 31.8 31.5 - 36.5 g/dL    RDW 13.4 10.0 - 15.0 %    Platelet Count 160 150 - 450 10e9/L        ASSESSMENT/PLAN:                                                          BMI:   Estimated body mass index is 22.59 kg/(m^2) as calculated from the following:    Height as of 2/23/17: 5' 9\" (1.753 m).    Weight as of this encounter: 153 lb (69.4 kg).         1. 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  - FOOT EXAM  - Hemoglobin A1c  - Basic metabolic panel  - TSH    2. CKD (chronic kidney disease) stage 3, GFR 30-59 ml/min  Pending labs but anticipate doing well  - Basic metabolic panel    3. Chronic systolic congestive heart failure (H)  Well-controlled currently    4. Iron deficiency anemia, unspecified iron deficiency anemia type  Remains anemic  - CBC with platelets          MEDICATIONS:  Continue current medications without change  Patient Instructions   Stop for labs.  I will check them of course and make changes only if needed  Brandon Christensen  [employer] [place] [employer]  For winter dry itchy skin rash,  Good creams lotions are about all we can do.        Mikie Dung Ibanez MD  [facility]  "

## 2017-04-05 NOTE — PATIENT INSTRUCTIONS
Stop for labs.  I will check them of course and make changes only if needed  Brandon Christensen  [employer] [place] [employer]  For winter dry itchy skin rash,  Good creams lotions are about all we can do.

## 2017-04-05 NOTE — NURSING NOTE
"Chief Complaint   Patient presents with     Heart Failure     Recheck     Diabetes     Recheck     Hypertension     Recheck     Lipids     Recheck     Heart Problem     Recheck CAD     Gastric Problem     Recheck     Derm Problem     c/o 'welts\" on back and shoulders       Initial /50 (BP Location: Left arm, Patient Position: Chair, Cuff Size: Adult Regular)  Pulse 64  Temp 97  F (36.1  C) (Temporal)  Resp 16  Wt 153 lb (69.4 kg)  SpO2 100%  BMI 22.59 kg/m2 Estimated body mass index is 22.59 kg/(m^2) as calculated from the following:    Height as of 2/23/17: 5' 9\" (1.753 m).    Weight as of this encounter: 153 lb (69.4 kg).   Patient states will check at pharmacy for PVC 13 status and schedule eye exam in [place]  Medication Reconciliation: complete  "

## 2017-04-05 NOTE — MR AVS SNAPSHOT
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  I will check them of course and make changes only if needed  Brandon Christensen  [employer] [place] [employer]  For winter dry itchy skin rash,  Good creams lotions are about all we can do.          Follow-ups after your visit        Who to contact     If you have questions or need follow up information about today's clinic visit or your schedule please contact [facility] directly at [phone].  Normal or non-critical lab and imaging results will be communicated to you by [employer], letter or phone within 4 business days after the clinic has received the results. If you do not hear from us within 7 days, please contact the clinic through [employer] or phone. If you have a critical or abnormal lab result, we will notify you by phone as soon as possible.  Submit refill requests through [employer] or call your pharmacy and they will forward the refill request to us. Please allow 3 business days for your refill to be completed.          Additional Information About Your Visit        MarketSharingharLink To Media Information     [employer] lets you send messages to your doctor, view your test results, renew your prescriptions, schedule appointments and more. To sign up, go to [email or web address] . Click on \"Log in\" on the left side of the screen, which will take you to the Welcome page. Then click on \"Sign up Now\" on the right side of the page. "     You will be asked to enter the access code listed below, as well as some personal information. Please follow the directions to create your username and password.     Your access code is: [email or web address]  Expires: 2017  4:22 PM     Your access code will  in 90 days. If you need help or a new code, please call your [place] clinic or [phone].        Care EveryWhere ID     This is your Care EveryWhere ID. This could be used by other organizations to access your [place] medical records  [id number]        Your Vitals Were     Pulse Temperature Respirations Pulse Oximetry BMI (Body Mass Index)       64 97  F (36.1  C) (Temporal) 16 100% 22.59 kg/m2        Blood Pressure from Last 3 Encounters:   17 122/50   17 128/46   17 145/56    Weight from Last 3 Encounters:   17 153 lb (69.4 kg)   17 151 lb (68.5 kg)   17 157 lb 13.6 oz (71.6 kg)              We Performed the Following     Basic metabolic panel     CBC with platelets     FOOT EXAM     Hemoglobin A1c     TSH        Primary Care Provider Office Phone # Fax #    Mikieraymond Ibanez [phone]       [facility] [address] [place] [phone]        Thank you!     Thank you for choosing [facility]  for your care. Our goal is always to provide you with excellent care. Hearing back from our patients is one way we can continue to improve our services. Please take a few minutes to complete the written survey that you may receive in the mail after your visit with us. Thank you!             Your Updated Medication List - Protect others around you: Learn how to safely use, store and throw away your medicines at www.disposemymeds.org.          This list is accurate as of: 17  8:00 AM.  Always use your most recent med list.                   Brand Name Dispense Instructions for use    ACCU-CHEK LURDES PLUS test strip   Generic drug:  blood glucose monitoring     100  each    TEST UP TO 4 TIMES DAILY       amLODIPine 10 MG tablet    NORVASC    90 tablet    Take 0.5 tablets (5 mg) by mouth daily       artificial saliva Soln solution     1 Bottle    Swish and spit 5 mLs in mouth as needed for dry mouth       aspirin 81 MG tablet     30 tablet    Take 1 tablet (81 mg) by mouth daily       blood glucose monitoring lancets     200 each    2-4 daily as needed for changing blood sugars       CITRACAL + D PO      Take 2 tablets by mouth daily.       cyanocobalamin 1000 MCG tablet    vitamin  B-12    100 Tab    2 daily       ferrous sulfate 325 (65 FE) MG tablet    IRON     Take 1 tablet by mouth daily (with breakfast).       furosemide 20 MG tablet    LASIX    30 tablet    Take 1 tablet (20 mg) by mouth daily       LIPITOR 80 MG tablet   Generic drug:  atorvastatin     30 tablet    Take 0.5 tablets (40 mg) by mouth daily       metoprolol 50 MG 24 hr tablet    TOPROL-XL    135 tablet    Take 1/2 tab every am and 1/2 tab every pm       valsartan 40 MG tablet    DIOVAN     1/2 TABLET IN AM       VITAMIN D PO      Take 1 tablet by mouth daily.

## 2017-05-02 NOTE — PROGRESS NOTES
Clinic Care Coordination     Reviewed pt chart, pt has been compliant with managing his own care and A1c is within normal limits.  According to last office note pt exercises and manages his weights well.    No further review or outreach from CC at this time.     Susy Tariq RN,BSN  Clinical Care Coordinator    [facility] [phone]  [facility] [phone]

## 2017-08-04 NOTE — TELEPHONE ENCOUNTER
Reason for Call:  Same Day Appointment, Requested Provider:  Mikie Ibanez M.D.    PCP: Mikie Ibanez    Reason for visit: sore in mouth, on upper lip    Duration of symptoms: about a week    Have you been treated for this in the past? No    Additional comments: Jennifer is calling stating he's had this sore for about a week and has been treating it at home, but it's not getting any better. They would like to see you today or Monday if possible. Please call back and advise.     Can we leave a detailed message on this number? YES    Phone number patient can be reached at: Home number on file [phone] (home)    Best Time: anytime    Call taken on 8/4/2017 at 11:18 AM by Mercedes Pan

## 2017-08-04 NOTE — TELEPHONE ENCOUNTER
Confirmed appointment with  Spouse for patient to be seen at 10:30 am on Monday JEMMA Ibanez/Jasmin Kumar CMA (AAMA)

## 2017-08-28 NOTE — TELEPHONE ENCOUNTER
"Dung López is a 89 year old male who calls with a 5lb weight gain.  RN spoke with wife per patients request.    NURSING ASSESSMENT:  Description:  5 lb weight gain with bilateral ankle, leg swelling  Onset/duration:  One week  Precip. factors:  Hx congested heart failure  Associated symptoms:  Some SOB when got garbage can from end of driveway.  Also no appetite, feels \"real full\".  Allergies:   Allergies   Allergen Reactions     Lisinopril [Ace Inhibitors] Cough     Zetia [Ezetimibe] Rash       NURSING PLAN: Huddle with provider, plan includes Take total of 40mg Lasix today and 40Mg of Lasix on Tuesday.  Make appt if no improvement.      RECOMMENDED DISPOSITION:  Home care advice - see above  Will comply with recommendation: Yes  If further questions/concerns or if symptoms do not improve, worsen or new symptoms develop, call your PCP or [facility] as soon as possible.      Guideline used:   Telephone Triage Protocols for Nurses, Fifth Edition, Dinah Nowak RN    "

## 2017-11-13 NOTE — TELEPHONE ENCOUNTER
": 1926  PHONE #'s: [phone] (home)     PRESENTING PROBLEM:   is having trouble with his speech. Cannot talk like he normally does.  Can he get in to see DR. Ibanez today?    NURSING ASSESSMENT  Description: \"  I will ask him a question and he won't answer. IT's like he cannot find words to say or something is wrong. HE has been like this for over a week , but noticeable more today. What to do?\"  Onset/duration:  Greater than one week  Precip. factors:   Etiology unknown  Assoc. Sx:   He can walk, He brought in the garbage cans this AM. Seems to eat OK.   Improves/worsens Sx:   worse  Pain scale (1-10)   Unknown.   Sx specific meds: baby asa  Last exam/Tx:   Has NOT been seen for this.     RECOMMENDED DISPOSITION:  To ED, another person to drive -   Will comply with recommendation: YES  If further questions/concerns or if Sx do not improve, worsen or new Sx develop, call your PCP or [facility] as soon as possible.    NOTES:  Disposition was determined by the first positive assessment question, therefore all previous assessment questions were negative.  Informed to check provider manual or call insurance company to assure coverage.    Guideline used: speaking difficulty  Telephone Triage Protocols for Nurses, Fifth Edition, Dinah Jackson RN  2017    "

## 2017-11-13 NOTE — ED PROVIDER NOTES
History     Chief Complaint   Patient presents with     Aphasia     HPI  Dung López is a 89 year old male who presents with type II diabetes, coronary artery disease, aortic insufficiency 2 weeks of aphasia.  His wife notes that his word finding has been difficult during this time.  No abrupt onset.  No associated dysarthria.  No head injury.  No isolated weakness or incoordination.  No trouble with swallowing.  No prior CVA.  History of aortic insufficiency and last echo was this past spring.  No known atrial fibrillation.      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      Priority: Medium     Rheumatic aortic insufficiency      Priority: Medium        Past Medical History:    Past Medical History:   Diagnosis Date     Atrial fibrillation  (H) 06/24/09     Congestive heart failure, unspecified 7/14/2009     Coronary atherosclerosis of native coronary artery      Other and unspecified angina pectoris 06/24/09     Personal history of other malignant neoplasm of skin      Pure hypercholesterolemia      Type II or unspecified type diabetes mellitus without mention of complication, not stated as uncontrolled 7/17/2009       Past Surgical History:    Past Surgical History:   Procedure Laterality Date     C EXCIS INFRATENT MENINGIOMA  1997    [facility]     CARDIAC CATHERIZATION  6/10/2009    [facility]     CAROTID ENDARTERECTOMY       COLONOSCOPY  12/20/2006    Melanosis coli.  Internal hemorrhoids.     CORONARY ARTERY BYPASS  7/2/2009    Triple coronary artery bypass grafting. [facility].     HC INJ TRANSFORAMIN EPIDURAL, LUMB/SACR EA ADDTL  2008     HC INJ TRANSFORAMIN EPIDURAL, LUMB/SACR SINGLE  2009     HC LEFT HEART CATHETERIZATION  03/16/2005    Left heart catheterization with selective coronary angiography and bilateral selective renal angiography.  [facility] [place].     HC REMV CATARACT EXTRACAP,INSERT LENS      Bilateral     HC REPAIR ING HERNIA,5+Y/O,REDUCIBL  1989       Family History:    No family history on file.    Social History:  Marital Status:   [2]  Social History   Substance Use Topics     Smoking status: Former Smoker     Packs/day: 0.40     Years: 5.00     Quit date: 1/1/1949     Smokeless tobacco: Never Used     Alcohol use No        Medications:      aspirin 81 MG tablet   furosemide (LASIX) 20 MG tablet   metoprolol (TOPROL-XL) 50 MG 24 hr tablet   amLODIPine (NORVASC) 10 MG tablet   ACCU-CHEK LURDES PLUS test strip   artificial saliva, BIOTENE MT, (BIOTENE MT) SOLN   atorvastatin (LIPITOR) 80 MG tablet   ACCU-CHEK MULTICLIX LANCETS MISC   ferrous sulfate 325 (65 FE) MG tablet   Calcium Citrate-Vitamin D (CITRACAL + D PO)   Cholecalciferol (VITAMIN D PO)   VALSARTAN 40 MG PO TABS   VITAMIN B-12 1000  MCG PO TABS         Review of Systems   Constitutional: Negative for chills, diaphoresis and fever.   HENT: Negative for ear pain, sinus pressure and sore throat.    Eyes: Negative for visual disturbance.   Respiratory: Negative for cough, shortness of breath and wheezing.    Cardiovascular: Negative for chest pain and palpitations.   Gastrointestinal: Negative for abdominal pain, blood in stool, constipation, diarrhea, nausea and vomiting.   Genitourinary: Negative for dysuria, frequency and urgency.   Skin: Negative for rash.   Neurological: Positive for speech difficulty. Negative for weakness and headaches.   All other systems reviewed and are negative.      Physical Exam   BP: 131/55  Heart Rate: 65  Temp: 97  F (36.1  C)  Resp: 14  Weight: 70.3 kg (155 lb)  SpO2: 100 %      Physical Exam   Constitutional: No distress.   HENT:   Head: Atraumatic.   Mouth/Throat: Oropharynx is clear and moist.   Eyes: Conjunctivae are normal.   Neck: Neck supple.   Cardiovascular: Normal rate and regular rhythm.  Exam reveals no gallop and no friction rub.    Murmur heard.   Systolic murmur is present    Diastolic murmur is present with a grade of 3/6   Pulmonary/Chest: Effort normal and breath sounds normal. No respiratory distress. He has no wheezes. He has no rales.   Abdominal: Soft. Bowel sounds are normal. He exhibits no distension. There is no tenderness. There is no rebound and no guarding.   Musculoskeletal: He exhibits no edema.   Neurological: He is alert. No cranial nerve deficit. He exhibits normal muscle tone. Coordination normal. GCS eye subscore is 4. GCS verbal subscore is 5. GCS motor subscore is 6.   Skin: No rash noted. He is not diaphoretic.      He knows his age of 89 but does not know the month.  He knows the current situation and knows where he is.  He knows his name.      ED Course     ED Course   Value Comment Time   Protein Total: (!) 6.7 (Reviewed) 11/13 1145   Chloride: 105 (Reviewed) 11/13 1252      Procedures                 EKG Interpretation:      Interpreted by Chris Muñiz MD    EKG done at 952 hours to treat sinus rhythm at 62 bpm with a normal axis.  No ST-T change.  No T wave changes.  Normal R progression and no Q waves.  OR is prolonged at 262.  Normal conduction.  No ectopy.  Impression sinus rhythm 60 bpm 1st degree AV block.  No change from February 2017.        Critical Care time:  none     The patient has stroke symptoms:           ED Stroke specific documentation           NIHSS PDF          Protocol PDF     Patient last known well time: 2 weeks ago  ED Provider first to bedside at: presentation  CT Results received at:  Patient was not treated with TPA due to the following reason(s):  Out of the treatment time window      National Institutes of Health Stroke Scale (Baseline)     Score    Level of consciousness: (0)   Alert, keenly responsive    LOC questions: (1)   Answers one question correctly    LOC commands: (0)   Performs both tasks correctly    Best gaze: (0)   Normal    Visual: (0)   No visual loss    Facial palsy: (1)   Minor paralysis (flat nasolabial fold, smile asymmetry)  ?? left labial droop    Motor arm (left): (0)   No drift    Motor arm (right): (0)   No drift    Motor leg (left): (0)   No drift    Motor leg (right): (0)   No drift    Limb ataxia: (0)   Absent    Sensory: (0)   Normal- no sensory loss    Best language: (1)   Mild to moderate aphasia    Dysarthria: (0)   Normal    Extinction and inattention: (0)   No abnormality        Total Score:  3        Stroke Mimics were considered (including migraine headache, seizure disorder, hypoglycemia (or hyperglycemia), head or spinal trauma, CNS infection, Toxin ingestion and shock state (e.g. sepsis) .                  Results for orders placed or performed during the hospital encounter of 11/13/17   MR Brain w/o & w Contrast    Narrative    MRI OF THE BRAIN WITHOUT AND WITH CONTRAST November 13, 2017 12:31 PM     HISTORY:  Two weeks of aphasia.    TECHNIQUE: Axial diffusion-weighted with ADC map, axial T2-weighted  with fat saturation, axial T1-weighted, axial turboFLAIR and coronal  T1-weighted images of the brain were acquired without intravenous  contrast.  Following intravenous administration of gadolinium (7.5 mL  Gadavist), axial T1-weighted images of the brain were acquired.     COMPARISON: Brain MR 8/10/2005.    FINDINGS: There is a heterogeneously enhancing nodule in the white  matter of the left frontal lobe measuring 2.9 x 3.1 x 2.7 cm in the  AP, transverse and cephalocaudad dimensions respectively. There are at  least four small enhancing satellite nodules surrounding this larger  nodule. There is moderate vasogenic edema surrounding these nodules.  Mass effect due to the nodules and surrounding vasogenic edema results  in mild local sulcal effacement but no midline shift or effacement of  the basal cisterns. There are no other areas of abnormal contrast  enhancement in the brain or its coverings. These findings represent  either primary or metastatic brain tumor. Clinical correlation for  history of malignancy is recommended. Biopsy may be necessary for  diagnosis.    There is no evidence for recent infarct. There is no hydrocephalus.  There is no evidence for intracranial hemorrhage. There is no midline  shift. There are no extra-axial fluid collisions.    A chronic right frontoparietal craniotomy is again noted.      Impression    IMPRESSION:  1. New enhancing nodules in the left frontal white matter represent  primary or metastatic malignancy. Clinical correlation for history of  malignancy recommended. Biopsy may be necessary for diagnosis.  2. No evidence for acute intracranial pathology.    MICAH SANDOVAL MD   Head MRA w/o contrast - STROKE PROTOCOL    Narrative    MR ANGIOGRAM OF THE HEAD WITHOUT CONTRAST   11/13/2017 12:32 PM     COMPARISON: None.    HISTORY: 2 weeks aphasic;     TECHNIQUE:  3D time-of-flight MR  angiogram of the head without  contrast. MIP reconstruction of all MR angiographic data was  performed.    FINDINGS:  The bilateral distal internal carotid, basilar, bilateral  anterior cerebral, bilateral middle cerebral and bilateral posterior  cerebral arteries are patent and unremarkable with no evidence for  cerebral artery stenosis or aneurysm. The anterior communicating  artery is patent and unremarkable.       Impression    IMPRESSION:  Normal MR angiogram of the head.      MICAH SANDOVAL MD   Neck MRA w & w/o contrast - STROKE PROTOCOL    Narrative    MRA NECK WITHOUT AND WITH CONTRAST  11/13/2017 12:33 PM     COMPARISON: None.    HISTORY: Aphasia x2 weeks, aphasia.     TECHNIQUE: 2D time-of-flight MR angiogram of the neck without contrast  and 3D MR angiogram of the neck with 7.5 mL Gadavist gadolinium IV  contrast.  MIP reconstruction of all MR angiographic data was  performed. Estimates of carotid stenoses are made relative to the  distal internal carotid artery diameters except as noted.    FINDINGS:    Carotids: The common carotid arteries bilaterally are widely patent.  There is atherosclerotic irregularity of the proximal left internal  carotid artery that results in mild narrowing (less than 50% luminal  diameter stenosis). The cervical internal carotid arteries bilaterally  are otherwise patent without narrowing or stenosis.    Vertebrals: The vertebral arteries bilaterally are patent without  stenosis and demonstrate antegrade flow.    Aortic arch: The arteries as they arise from the aortic arch are  normally arranged with no evidence for stenosis.      Impression    IMPRESSION:   1. Mild atherosclerotic narrowing (less than 50% luminal diameter  stenosis) of the proximal left internal carotid artery.  2. Otherwise, normal neck MRA.    MICAH SANDOVAL MD   CBC with platelets differential   Result Value Ref Range    WBC 6.1 4.0 - 11.0 10e9/L    RBC Count 2.97 (L) 4.4 - 5.9 10e12/L    Hemoglobin 9.2  (L) 13.3 - 17.7 g/dL    Hematocrit 30.3 (L) 40.0 - 53.0 %     (H) 78 - 100 fl    MCH 31.0 26.5 - 33.0 pg    MCHC 30.4 (L) 31.5 - 36.5 g/dL    RDW 13.3 10.0 - 15.0 %    Platelet Count 149 (L) 150 - 450 10e9/L    Diff Method Automated Method     % Neutrophils 59.3 %    % Lymphocytes 17.2 %    % Monocytes 11.9 %    % Eosinophils 9.3 %    % Basophils 1.8 %    % Immature Granulocytes 0.5 %    Absolute Neutrophil 3.6 1.6 - 8.3 10e9/L    Absolute Lymphocytes 1.0 0.8 - 5.3 10e9/L    Absolute Monocytes 0.7 0.0 - 1.3 10e9/L    Absolute Eosinophils 0.6 0.0 - 0.7 10e9/L    Absolute Basophils 0.1 0.0 - 0.2 10e9/L    Abs Immature Granulocytes 0.0 0 - 0.4 10e9/L   Comprehensive metabolic panel   Result Value Ref Range    Sodium 141 133 - 144 mmol/L    Potassium 4.7 3.4 - 5.3 mmol/L    Chloride 105 94 - 109 mmol/L    Carbon Dioxide 27 20 - 32 mmol/L    Anion Gap 9 3 - 14 mmol/L    Glucose 114 (H) 70 - 99 mg/dL    Urea Nitrogen 42 (H) 7 - 30 mg/dL    Creatinine 1.52 (H) 0.66 - 1.25 mg/dL    GFR Estimate 43 (L) >60 mL/min/1.7m2    GFR Estimate If Black 52 (L) >60 mL/min/1.7m2    Calcium 8.7 8.5 - 10.1 mg/dL    Bilirubin Total 1.2 0.2 - 1.3 mg/dL    Albumin 3.6 3.4 - 5.0 g/dL    Protein Total 6.7 (L) 6.8 - 8.8 g/dL    Alkaline Phosphatase 50 40 - 150 U/L    ALT 26 0 - 70 U/L    AST 30 0 - 45 U/L   Troponin I   Result Value Ref Range    Troponin I ES <0.015 0.000 - 0.045 ug/L   TSH with free T4 reflex   Result Value Ref Range    TSH 2.22 0.40 - 4.00 mU/L         Assessments & Plan (with Medical Decision Making)     MDM: Dung López is a 89 year old male with a mitral regurgitation and aortic insufficiency, coronary disease and type II diabetes,  who presented with 2 weeks of aphasia but no other associated significant neurologic changes.  All of the changes appear related to word finding difficulty.  These were also identified today with an otherwise unremarkable neurologic exam with exception of a possible minimal facial  droop right sided.  He had no significant other symptoms associated headache.  Given the long duration from his onset MRI was performed only and this demonstrated no obvious vascular findings but did have a mass on the left frontal region.  I discussed with Dr. Simmons at [facility] with the patient wishes to be seen - he had a prior right sided benign mass years ago that was treated by neurosurgery at that facility.  The patient will be seen in their clinic for follow-up.  Today he is GCS 15 and able to ambulate easily tolerating food and fluids without signs of aspiration.  Given his underlying diabetes we'll hold on Decadron and there is currently no midline shift seen on imaging.      I have reviewed the nursing notes.    I have reviewed the findings, diagnosis, plan and need for follow up with the patient.       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   I have also attached a consultation for neurosurgery.  Call [facility] at [phone].  I spoke with Dr. Simmons.  Please let them know this is a new braijn cancer that he expects appt this week in next few days withj one of the neurosurgeons speicalizing in this areas.  return immed for worsening.  avoid falls.   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

## 2017-11-13 NOTE — DISCHARGE INSTRUCTIONS
ICD-10-CM    1. 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 Brain mass G93.9 NEUROSURGERY REFERRAL    This is most likely a cancer and needs further evaluation. I am working on arranging follow-up with neurosurgery.  Return immed for new changes. you should receive a phone call by later today.  I hav also attached a consultation for neurosurgery

## 2017-11-13 NOTE — ED AVS SNAPSHOT
[facility]    [address] [place] [address]    Phone:  [phone]    Fax:  [phone]                                       Dung López   MRN: [id number]    Department:  [facility]   Date of Visit:  11/13/2017           After Visit Summary Signature Page     I have received my discharge instructions, and my questions have been answered. I have discussed any challenges I see with this plan with the nurse or doctor.    ..........................................................................................................................................  Patient/Patient Representative Signature      ..........................................................................................................................................  Patient Representative Print Name and Relationship to Patient    ..................................................               ................................................  Date                                            Time    ..........................................................................................................................................  Reviewed by Signature/Title    ...................................................              ..............................................  Date                                                            Time

## 2017-11-13 NOTE — ED AVS SNAPSHOT
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   I have also attached a consultation for neurosurgery.  Call [facility] Neurosurgery at [phone].  I spoke with Dr. Simmons.  Please let them know this is a new braijn cancer that he expects appt this week in next few days withj one of the neurosurgeons speicalizing in this areas.  return immed for worsening.  avoid falls.       You were seen by Chris Muñiz MD.      Follow-up Information     Schedule an appointment as soon as possible for a visit with neurosurgery.    Contact information:    [phone]        Follow up with [facility] Emergency Department.    Specialty:  EMERGENCY MEDICINE    Why:  As needed, If symptoms worsen    Contact information:    [address] [place] [address] [phone]    Additional information:    From [address]: Exit at [address] on [place] side of [place]. Turn right on [address]. Turn left at stoplight on [address]. [facility] will be in view two blocks ahead        Discharge Instructions         ICD-10-CM    1. 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 Brain mass G93.9 NEUROSURGERY REFERRAL    This is most likely a cancer and needs further evaluation. I am working on arranging follow-up with neurosurgery.  Return immed for new changes. you should receive a phone call by later today.  I hav also attached a consultation for neurosurgery         Discharge References/Attachments     BRAIN  TUMORS  (ENGLISH)      24 Hour Appointment Hotline       To make an appointment at any [facility], call [phone] ([phone]). If you don't have a family doctor or clinic, we will help you find one. [facility] are conveniently located to serve the needs of you and your family.          ED Discharge Orders     NEUROSURGERY REFERRAL       Your provider has referred you to: [facility]: [facility]surgery [facility] [phone]   [email or web address]  [facility]: [facility] Neurosurgery Clinic [phone]   [email or web address]  FM[facility]: [facility]  Neurosurgery Clinic [phone]   [email or web address]  [facility]: [facility] [place] [facility] [place] [phone]   [email or web address]  [facility]: Neurosurgery Clinic [facility] [phone]   [email or web address]    Please be aware that coverage of these services is subject to the terms and limitations of your health insurance plan.  Call member services at your health plan with any benefit or coverage questions.      Please bring the following with you to your appointment:    (1) Any X-Rays, CTs or MRIs which have been performed.  Contact the facility where they were done to arrange for  prior to your scheduled appointment.   (2) List of current medications  (3) This referral request   (4) Any documents/labs given to you for this referral                     Review of your medicines      Our records show that you are taking the medicines listed below. If these are incorrect, please call your family doctor or clinic.        Dose / Directions Last dose taken    ACCU-CHEK LURDES PLUS test strip   Quantity:  100 each   Generic drug:  blood glucose monitoring        TEST UP TO 4 TIMES DAILY   Refills:  9        amLODIPine 10 MG tablet   Commonly known as:  NORVASC    Dose:  5 mg   Quantity:  90 tablet        Take 0.5 tablets (5 mg) by mouth daily   Refills:  3        artificial saliva Soln solution   Dose:  5 mL   Quantity:  1 Bottle        Swish and spit 5 mLs in mouth as needed for dry mouth   Refills:  5        aspirin 81 MG tablet   Dose:  81 mg   Quantity:  30 tablet        Take 1 tablet (81 mg) by mouth daily   Refills:  0        blood glucose monitoring lancets   Quantity:  200 each        2-4 daily as needed for changing blood sugars   Refills:  3        CITRACAL + D PO   Dose:  2 tablet        Take 2 tablets by mouth daily.   Refills:  0        cyanocobalamin 1000 MCG tablet   Commonly known as:  vitamin  B-12   Quantity:  100 Tab        2 daily   Refills:  11        ferrous sulfate 325 (65 FE) MG tablet   Commonly known as:  IRON   Dose:  1 tablet        Take 1 tablet by mouth daily (with breakfast).   Refills:  0        furosemide 20 MG tablet   Commonly known as:  LASIX   Dose:  20 mg   Quantity:  30 tablet        Take 1 tablet (20 mg) by mouth daily   Refills:  5        LIPITOR 80 MG tablet   Dose:  40 mg   Quantity:  30 tablet   Generic drug:  atorvastatin        Take 0.5 tablets (40 mg) by mouth daily   Refills:  0        metoprolol 50 MG 24 hr tablet   Commonly known as:  TOPROL-XL   Indication:  per pt he takes once a day   Quantity:  135 tablet        Take 1/2 tab every am and 1/2 tab every pm   Refills:  1        valsartan 40 MG tablet   Commonly known as:  DIOVAN        1/2 TABLET IN AM   Refills:  1        VITAMIN D PO   Dose:  1 tablet        Take 1 tablet by mouth daily.   Refills:  0                Procedures and tests performed during your visit     CBC with platelets differential    Comprehensive metabolic panel    EKG 12-lead, tracing only    Head MRA w/o contrast - STROKE PROTOCOL    MR Brain w/o & w Contrast    Neck MRA w & w/o contrast - STROKE PROTOCOL    Peripheral IV: Standard    TSH with free T4 reflex    Troponin I    Vitamin B12      Orders  "Needing Specimen Collection     None      Pending Results     Date and Time Order Name Status Description    2017 1048 Vitamin B12 In process             Pending Culture Results     No orders found from 2017 to 2017.            Pending Results Instructions     If you had any lab results that were not finalized at the time of your Discharge, you can call the ED Lab Result RN at [phone]. You will be contacted by this team for any positive Lab results or changes in treatment. The nurses are available 7 days a week from 10A to 6:30P.  You can leave a message 24 hours per day and they will return your call.        Thank you for choosing [facility]       Thank you for choosing [facility] for your care. Our goal is always to provide you with excellent care. Hearing back from our patients is one way we can continue to improve our services. Please take a few minutes to complete the written survey that you may receive in the mail after you visit with us. Thank you!        [facility] Information     [employer] lets you send messages to your doctor, view your test results, renew your prescriptions, schedule appointments and more. To sign up, go to [email or web address] . Click on \"Log in\" on the left side of the screen, which will take you to the Welcome page. Then click on \"Sign up Now\" on the right side of the page.     You will be asked to enter the access code listed below, as well as some personal information. Please follow the directions to create your username and password.     Your access code is: [email or web address]  Expires: 2018  2:26 PM     Your access code will  in 90 days. If you need help or a new code, please call your [facility] clinic or [phone].        Care EveryWhere ID     This is your Care EveryWhere ID. This could be used by other organizations to access your [facility] medical records  [id number]        Equal Access to Services     ZAKI YEAGER: Nhan Dixon, " filemon cr, praveen gonzalez. So [facility] [phone].    ATENCIÓN: Si habla español, tiene a hutton disposición servicios gratuitos de asistencia lingüística. Llame al [phone].    We comply with applicable federal civil rights laws and [place] laws. We do not discriminate on the basis of race, color, national origin, age, disability, sex, sexual orientation, or gender identity.            After Visit Summary       This is your record. Keep this with you and show to your community pharmacist(s) and doctor(s) at your next visit.

## 2017-11-21 NOTE — TELEPHONE ENCOUNTER
Reason for Call:  Other call back    Detailed comments: Jennifer has questions aobut Dung's scan he had in the ER. Please call her back. She is ok to wait til Marcelle returns tomorrow.    Phone Number Patient can be reached at: Home number on file [phone] (home)    Best Time: anytime    Can we leave a detailed message on this number? NO    Call taken on 11/21/2017 at 11:07 AM by Mercedes Pan

## 2017-11-22 PROBLEM — C79.31 MALIGNANT NEOPLASM METASTATIC TO BRAIN (H): Status: ACTIVE | Noted: 2017-01-01

## 2017-11-22 NOTE — TELEPHONE ENCOUNTER
I confirmed hospice has been initiated and they will be coming to the patient's house in a couple days. At this point they do not need any services but they will at least be enrolled and it will be available to them as more needs are necessary. I have also indicated yes I am aware of the diagnosis and please keep me informed if there are things that aren't being addressed by others. Mikie Ibanez M.D.

## 2017-11-22 NOTE — TELEPHONE ENCOUNTER
I have not seen him since his ED visit. The emergency room doctor indicated the plan for patient to see previous neurosurgeon,  in [place]. Case was discussed with this physician. Review of MRI report indicates findings that were changing from previous brain MRI. They were consistent with recent inflammation. Please call patient/patient's wife and see what her questions may be. I have very little to offer other than he needs to see neurosurgery for more definitive plan. Secondary to concerns, I will route this to the RN triage 4. Mikie Ibanez M.D.

## 2017-11-22 NOTE — TELEPHONE ENCOUNTER
"Jennifer Mathur just wanted to let you know that Kendall was Dx with brain cancer.  \" He has a fast aggressive tumor. He has decided NOT to have the surgery at his age.  They have given him 3 months to live.  I called Dr. Waterman at home last Sunday and he set up Home Care to come out this Friday to evaluate things.  I just want you to be aware of our situation and then want to know what you would recommend?\"  [phone] (home) . ..................KHUSHBOO Cotter    "

## 2017-11-24 NOTE — PROGRESS NOTES
[facility] now requests orders and shares plan of care/discharge summaries for some patients through [employer].  Please REPLY TO THIS MESSAGE in order to give authorization for orders when needed.  This is considered a verbal order, you will still receive a faxed copy of orders for signature.  Thank you for your assistance in improving collaboration for our patients.    ORDER  Patient admitted to [facility] services effective 11/24/17 for DX Brain Mass.  If questions or concerns please contact admission clinician listed below.  Thanks    Dorota Morel RN, BSN, PHN  Hospice Admission Clinician/PACCT RN [facility]  [address] [place] [id number]  [email or web address]  [email or web address]   Office: [phone]   Cell: [phone]

## 2017-12-04 NOTE — TELEPHONE ENCOUNTER
Phuong [facility] notified. Kendall most likely took all his pills already to day.  She will have him stop his Furosemide on Tuesday and Wednesday she will have them stop Amlodipine.  A nurse will be out Thursday to monitor him and we will see how he is at that time. He does not have a BP cuff at home and is somewhat resistant to Nurse visits.      Phuong states that Dung is somewhat weaker and using his walker to get around.     Hoa Nowak RN

## 2017-12-04 NOTE — TELEPHONE ENCOUNTER
Furosemide should be discontinued initially. Amlodipine should be discontinued second. If he continues with low blood pressures and fainting spells, metoprolol once daily dosing would be next. Than I would stop losartan. Changes may be made as quickly as every 24-48 hours. Sent to both Lilia Nowak and Stephanie Jackson as well as the RN triage pool because this has some urgency and may require more information. Mikie Ibanez M.D.

## 2017-12-04 NOTE — TELEPHONE ENCOUNTER
Reason for call:  Patient reporting a symptom    Symptom or request: low blood pressure 92/58, heart rate is in the 50's    Duration (how long have symptoms been present): 3 days    Have you been treated for this before? Yes    Additional comments: Dung had a fainting spell on Saturday, today is blood pressure is 92/58, his heart rate is in the 50's, Phuong from home care would like to know if you would like to make any adjustments to his cardiac medications, she said he is on 3 heart medications.    Phone Number patient can be reached at:  Other phone number:  [phone]*    Best Time:      Can we leave a detailed message on this number:  YES    Call taken on 12/4/2017 at 9:18 AM by Janina Del Toro

## 2017-12-12 NOTE — TELEPHONE ENCOUNTER
Forms received from [facility]   on 12/12/17 for case conference.  Forms with RN to review medications.  Orders pended for provider to sign.    Forms given to PCP for signature on .

## 2018-01-01 ENCOUNTER — DOCUMENTATION ONLY (OUTPATIENT)
Dept: CARE COORDINATION | Facility: CLINIC | Age: 83
End: 2018-01-01

## 2022-10-04 PROBLEM — E11.22 TYPE 2 DIABETES MELLITUS WITH DIABETIC CHRONIC KIDNEY DISEASE (H): Status: ACTIVE | Noted: 2017-01-01

## 2025-05-31 NOTE — LETTER
Transition Communication Hand-off for Care Transitions to Next Level of Care Provider    Name: Dung López  MRN #: [id number]  Primary Care Provider: Mikie Ibanez     Primary Clinic: [facility] [address] [place] [address]     Reason for Hospitalization:  Acute on chronic congestive heart failure, unspecified congestive heart failure type (H) [I50.9]  Admit Date/Time: 2/23/2017  3:39 PM  Discharge Date: 2/24/17  Payor Source: Payor: BCBS / Plan: BCBS PLATINUM BLUE / Product Type: PPO /     Reason for Communication Hand-off Referral: Admission diagnoses: CHF    Discharge Plan: Home w/family       Concern for non-adherence with plan of care: not assessed    Already enrolled in Tele-monitoring program and name of program:  No  Follow-up specialty is recommended: Yes, Cardiology    Follow-up plan:  Future Appointments  Date Time Provider Department Center   3/3/2017 10:30 AM Mikie Ibanez MD [facility]       Any outstanding tests or procedures:              Key Recommendations:  CTS not able to meet with patient in hospital. Per Dr. Lozano's dc note:    Echocardiogram demonstrated normal LV ejection fraction with normal right ventricle size and function. Mild LVH was present, but there was no mention of diastolic dysfunction. Mitral valve chordae were abnormal and there was moderately severe to severe mitral regurgitation with an eccentric jet. Moderate pulmonary hypertension was also described. After one dose of IV Lasix, he had brisk diuresis with dramatic clinical improvement. He was able to tolerate advancing activity without significant exertional dyspnea by discharge. He was advised to use oral Lasix daily rather than as needed and to monitor daily weights. A 2 g sodium diet was recommended along with his usual diabetic diet. 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    Carmela Nair    AVS/Discharge Summary is the source of truth; this is a helpful guide for improved communication of patient story